# Patient Record
Sex: FEMALE | Race: WHITE | NOT HISPANIC OR LATINO | Employment: FULL TIME | URBAN - METROPOLITAN AREA
[De-identification: names, ages, dates, MRNs, and addresses within clinical notes are randomized per-mention and may not be internally consistent; named-entity substitution may affect disease eponyms.]

---

## 2023-05-22 ENCOUNTER — HOSPITAL ENCOUNTER (OUTPATIENT)
Dept: DATA CONVERSION | Facility: HOSPITAL | Age: 28
End: 2023-05-22
Attending: ORTHOPAEDIC SURGERY | Admitting: ORTHOPAEDIC SURGERY
Payer: COMMERCIAL

## 2023-05-22 DIAGNOSIS — Z79.82 LONG TERM (CURRENT) USE OF ASPIRIN: ICD-10-CM

## 2023-05-22 DIAGNOSIS — Z87.81 PERSONAL HISTORY OF (HEALED) TRAUMATIC FRACTURE: ICD-10-CM

## 2023-05-22 DIAGNOSIS — Z47.2 ENCOUNTER FOR REMOVAL OF INTERNAL FIXATION DEVICE: ICD-10-CM

## 2023-05-22 DIAGNOSIS — T84.84XA PAIN DUE TO INTERNAL ORTHOPEDIC PROSTHETIC DEVICES, IMPLANTS AND GRAFTS, INITIAL ENCOUNTER (CMS-HCC): ICD-10-CM

## 2023-09-07 VITALS
DIASTOLIC BLOOD PRESSURE: 64 MMHG | SYSTOLIC BLOOD PRESSURE: 111 MMHG | BODY MASS INDEX: 21.35 KG/M2 | RESPIRATION RATE: 18 BRPM | HEIGHT: 61 IN | WEIGHT: 113.1 LBS | TEMPERATURE: 97.5 F | HEART RATE: 78 BPM

## 2023-09-30 PROBLEM — S52.209A FRACTURE OF ULNA: Status: ACTIVE | Noted: 2023-09-30

## 2023-09-30 PROBLEM — S52.109A RADIUS AND ULNA PROXIMAL END FRACTURE: Status: ACTIVE | Noted: 2023-09-30

## 2023-09-30 PROBLEM — S52.009A RADIUS AND ULNA PROXIMAL END FRACTURE: Status: ACTIVE | Noted: 2023-09-30

## 2023-09-30 PROBLEM — K58.9 IRRITABLE BOWEL SYNDROME: Status: ACTIVE | Noted: 2023-09-30

## 2023-09-30 PROBLEM — F41.0 GENERALIZED ANXIETY DISORDER WITH PANIC ATTACKS: Status: ACTIVE | Noted: 2023-09-30

## 2023-09-30 PROBLEM — S62.174A: Status: ACTIVE | Noted: 2023-09-30

## 2023-09-30 PROBLEM — T78.1XXA GASTROINTESTINAL FOOD SENSITIVITY: Status: ACTIVE | Noted: 2023-09-30

## 2023-09-30 PROBLEM — F41.1 GENERALIZED ANXIETY DISORDER WITH PANIC ATTACKS: Status: ACTIVE | Noted: 2023-09-30

## 2023-09-30 PROBLEM — K59.09 CHRONIC CONSTIPATION: Status: ACTIVE | Noted: 2023-09-30

## 2023-09-30 PROBLEM — S62.234A OTHER NONDISPLACED FRACTURE OF BASE OF FIRST METACARPAL BONE, RIGHT HAND, INITIAL ENCOUNTER FOR CLOSED FRACTURE: Status: ACTIVE | Noted: 2023-09-30

## 2023-09-30 PROBLEM — R59.9 REACTIVE LYMPHADENOPATHY: Status: ACTIVE | Noted: 2023-09-30

## 2023-09-30 RX ORDER — ACETAMINOPHEN 500 MG
TABLET ORAL
COMMUNITY

## 2023-09-30 RX ORDER — SERTRALINE HYDROCHLORIDE 50 MG/1
1 TABLET, FILM COATED ORAL
COMMUNITY
Start: 2022-10-24 | End: 2023-10-25 | Stop reason: SDUPTHER

## 2023-09-30 RX ORDER — MULTIVITAMIN
1 TABLET ORAL 2 TIMES DAILY
COMMUNITY
Start: 2022-12-16 | End: 2023-10-25 | Stop reason: ALTCHOICE

## 2023-09-30 RX ORDER — DOCUSATE SODIUM 100 MG/1
CAPSULE, LIQUID FILLED ORAL
COMMUNITY
End: 2023-10-25 | Stop reason: ALTCHOICE

## 2023-09-30 RX ORDER — TRETINOIN 0.5 MG/G
1 CREAM TOPICAL
COMMUNITY
Start: 2015-02-12 | End: 2023-10-25 | Stop reason: ALTCHOICE

## 2023-09-30 NOTE — H&P
History of Present Illness:   Pregnant/Lactating:  ·  Are You Pregnant no   ·  Are You Currently Breastfeeding no     History Present Illness:  Reason for surgery: hardware removal   HPI:    Patient presents for hardware removal    Allergies:        Allergies:  ·  sulfa drugs : Rash  ·  Gluten : Other    Home Medication Review:   Home Medications Reviewed: yes     Impression/Procedure:   ·  Impression and Planned Procedure: Hardware removal from forearm       ERAS (Enhanced Recovery After Surgery):  ·  ERAS Patient: no       Physical Exam by System:    Respiratory/Thorax: Patent airways, good chest expansion,  thorax symmetric   Cardiovascular: RRR to palpation     Consent:   COVID-19 Consent:  ·  COVID-19 Risk Consent Surgeon has reviewed key risks related to the risk of kate COVID-19 and if they contract COVID-19 what the risks are.     Attestation:   Note Completion:  I am a:  Resident/Fellow   Attending Attestation I saw and evaluated the patient.  I personally obtained the key and critical portions of the history and physical exam or was physically present for key and  critical portions performed by the resident/fellow. I reviewed the resident/fellow?s documentation and discussed the patient with the resident/fellow.  I agree with the resident/fellow?s medical decision making as documented in the note.     I personally evaluated the patient on 22-May-2023         Electronic Signatures:  Reece Nichols)  (Signed 22-May-2023 08:31)   Authored: Note Completion   Co-Signer: History of Present Illness, Allergies, Home Medication Review, Impression/Procedure, ERAS, Physical Exam, Consent, Note Completion  Justin Oscar (Resident))  (Signed 22-May-2023 05:47)   Authored: History of Present Illness, Allergies, Home  Medication Review, Impression/Procedure, ERAS, Physical Exam, Consent, Note Completion      Last Updated: 22-May-2023 08:31 by Reece Nichols)

## 2023-09-30 NOTE — H&P
History of Present Illness:   Pregnant/Lactating:  ·  Are You Pregnant no (1)   ·  Are You Currently Breastfeeding no (1)     History Present Illness:  Reason for surgery: removal of hardware from L arm   HPI:    DENISE is 10 months post-op from ORIF left both bone forearm fracture on 6/10/2022. She is doing well at this point. Pain is well controlled. Denies fevers or chills.  Denies drainage from the wound. She reports no additional symptoms or concerns. No shortness of breath or chest pain. No calf swelling or pain.     Allergies:        Allergies:  ·  sulfa drugs : Rash  ·  Latex : Itching, Hives/Urticaria  ·  Gluten : Other    Home Medication Review:   Home Medications Reviewed: yes     Impression/Procedure:   ·  Impression and Planned Procedure: YUE from L arm       ERAS (Enhanced Recovery After Surgery):  ·  ERAS Patient: no       Vital Signs:  Temperature C: 36.4 degrees C   Temperature F: 97.5 degrees F   Heart Rate: 78 beats per minute   Respiratory Rate: 18 breath per minute   Blood Pressure Systolic: 111 mm/Hg   Blood Pressure Diastolic: 64 mm/Hg     Physical Exam by System:    Respiratory/Thorax: non labored breathing, on room  air   Cardiovascular: RRR, extremities warm and well perfused   Musculoskeletal: LUE:   - Surgical site well healed   -Motor intact in axillary/AIN/PIN/ulnar distributions  -SILT axillary/radial/median/ulnar distributions  -Hand warm, well perfused  -Palpable radial pulse, cap refill brisk  -Compartments soft and compressible.   Psychological: appropriate behavior     Consent:   COVID-19 Consent:  ·  COVID-19 Risk Consent Surgeon has reviewed key risks related to the risk of kate COVID-19 and if they contract COVID-19 what the risks are.     Attestation:   Note Completion:  I am a:  Resident/Fellow   Attending Attestation I saw and evaluated the patient.  I personally obtained the key and critical portions of the history and physical exam or was physically present for  "key and  critical portions performed by the resident/fellow. I reviewed the resident/fellow?s documentation and discussed the patient with the resident/fellow.  I agree with the resident/fellow?s medical decision making as documented in the note.     I personally evaluated the patient on 22-May-2023         Electronic Signatures:  Reece Nichols (MD)  (Signed 22-May-2023 08:31)   Authored: Physical Exam, Note Completion   Co-Signer: History of Present Illness, Allergies, Home Medication Review, Impression/Procedure, ERAS, Physical Exam, Consent, Note Completion  Justine Mejia (Resident))  (Signed 22-May-2023 06:09)   Authored: History of Present Illness, Allergies, Home  Medication Review, Impression/Procedure, ERAS, Physical Exam, Consent, Note Completion      Last Updated: 22-May-2023 08:31 by Reece Nichols (MD)    References:  1.  Data Referenced From \"Patient Profile - Preop v3\" 22-May-2023 06:00   "

## 2023-10-02 NOTE — OP NOTE
PROCEDURE DETAILS    Preoperative Diagnosis:  L forearm prominent hardware  Postoperative Diagnosis:  L forearm prominent hardware  Surgeon: Simone  Resident/Fellow/Other Assistant: Celestina    Procedure:  L ulna removal of hardware  Estimated Blood Loss: 10  Findings: Consistent with preoperative diagnosis                                Attestation:   Note Completion:  Attending Attestation I was present for key portions of the procedure and the procedure lasted longer than 5 minutes.    I am a: Resident/Fellow         Electronic Signatures:  Reece Nichols)  (Signed 22-May-2023 12:16)   Authored: Note Completion   Co-Signer: Post-Operative Note, Chart Review, Note Completion  Justin Oscar (Resident))  (Signed 22-May-2023 08:22)   Authored: Post-Operative Note, Chart Review, Note Completion      Last Updated: 22-May-2023 12:16 by Reeec Nichols)

## 2023-10-04 ENCOUNTER — TREATMENT (OUTPATIENT)
Dept: OCCUPATIONAL THERAPY | Facility: HOSPITAL | Age: 28
End: 2023-10-04
Payer: COMMERCIAL

## 2023-10-04 DIAGNOSIS — M25.522 PAIN IN LEFT ELBOW: Primary | ICD-10-CM

## 2023-10-04 PROCEDURE — 97035 APP MDLTY 1+ULTRASOUND EA 15: CPT | Mod: GO | Performed by: OCCUPATIONAL THERAPIST

## 2023-10-04 PROCEDURE — 97110 THERAPEUTIC EXERCISES: CPT | Mod: GO | Performed by: OCCUPATIONAL THERAPIST

## 2023-10-04 PROCEDURE — 97016 VASOPNEUMATIC DEVICE THERAPY: CPT | Mod: GO | Performed by: OCCUPATIONAL THERAPIST

## 2023-10-04 PROCEDURE — 97140 MANUAL THERAPY 1/> REGIONS: CPT | Mod: GO | Performed by: OCCUPATIONAL THERAPIST

## 2023-10-04 ASSESSMENT — ENCOUNTER SYMPTOMS
LOSS OF SENSATION IN FEET: 0
DEPRESSION: 0
OCCASIONAL FEELINGS OF UNSTEADINESS: 0

## 2023-10-04 ASSESSMENT — PAIN - FUNCTIONAL ASSESSMENT: PAIN_FUNCTIONAL_ASSESSMENT: 0-10

## 2023-10-04 ASSESSMENT — PAIN SCALES - GENERAL: PAINLEVEL_OUTOF10: 0 - NO PAIN

## 2023-10-04 NOTE — PROGRESS NOTES
Occupational Therapy  Occupational Therapy Treatment    Patient Name: Cely Chong  MRN: [id number]  Today's Date: 10/4/2023  Time Calculation  Start Time: 1000  Stop Time: 1105  Time Calculation (min): 65 min    Visit number: 4 of 40    Insurance Type: MMO    Assessment: Pt did well with exercises today, she is doing more overall and trying some of the exercises at home.  She did have an issue with the back which limited her in doing so, however she feels she can get back on track with exercises now.       Plan: Continue with progressive exercises for UE strengthening       Current Problem  1. Pain in left elbow  Follow Up In Occupational Therapy          General     Precautions  Precautions  STEADI Fall Risk Score (The score of 4 or more indicates an increased risk of falling): 0      Subjective:   Subjective   Patient reports He has some irration on the top of his hand    Pain  Pain Assessment: 0-10  Pain Score: 0 - No pain    Objective:   Objective     Elbow/Forearm AROM WNL    ELBOW    R L   Extension 5/5 4+/5   Flexion 5/5 4/5   Pronation 5/5 4+/5   Supination 5/5 4+/5      Physical Observation: no new observations    Treatments:     Therex:   Exercise Sets/Reps Comments   Miko row, shoulder ext, tricep rope ext 3x10 each    Red flexbar wrist flex/ext 3x10 each    TRX Y, press 310 each                   Medbridge HEP Code        Manual: STM flexor pronator group, bicep/tricep     Modalities: X10 min themx hot compression to L elbow 110 degrees F @ 45 mmHg pressure   X8 min continuous 3MHz @ 1.0w/cm2 to L lateral elbow      Orthosis:      Therapeutic Activity:      Wound Care:     Neuro Re-ed:      Self-Care:     Other Treatment:     Matt Ramirez, OT

## 2023-10-13 ENCOUNTER — TREATMENT (OUTPATIENT)
Dept: OCCUPATIONAL THERAPY | Facility: HOSPITAL | Age: 28
End: 2023-10-13
Payer: COMMERCIAL

## 2023-10-13 DIAGNOSIS — M25.522 PAIN IN LEFT ELBOW: Primary | ICD-10-CM

## 2023-10-13 PROCEDURE — 97016 VASOPNEUMATIC DEVICE THERAPY: CPT | Mod: GO | Performed by: OCCUPATIONAL THERAPIST

## 2023-10-13 PROCEDURE — 97110 THERAPEUTIC EXERCISES: CPT | Mod: GO | Performed by: OCCUPATIONAL THERAPIST

## 2023-10-13 PROCEDURE — 97035 APP MDLTY 1+ULTRASOUND EA 15: CPT | Mod: GO | Performed by: OCCUPATIONAL THERAPIST

## 2023-10-13 ASSESSMENT — PAIN - FUNCTIONAL ASSESSMENT: PAIN_FUNCTIONAL_ASSESSMENT: 0-10

## 2023-10-13 ASSESSMENT — PAIN SCALES - GENERAL: PAINLEVEL_OUTOF10: 0 - NO PAIN

## 2023-10-13 NOTE — PROGRESS NOTES
Occupational Therapy  Occupational Therapy Treatment    Patient Name: Cely Chong  MRN: [id number]  Today's Date: 10/13/2023  Time Calculation  Start Time: 0900  Stop Time: 0958  Time Calculation (min): 58 min    Visit number: 4 of 40    Insurance Type: MMO    Assessment: Pt seems to be gaining some strength overall.  She has difficulty still with forearm and elbow strength specifically in flexion and radial deviation.  Tricep strength improved.       Plan: Continue with progressive exercises for UE strengthening       Current Problem  1. Pain in left elbow            Subjective:   Subjective   Patient reports He has some irration on the top of his hand    Pain  Pain Assessment: 0-10  Pain Score: 0 - No pain    Objective:   Objective     Elbow/Forearm AROM WNL    ELBOW    R L   Extension 5/5 5/5   Flexion 5/5 4/5   Pronation 5/5 4+/5   Supination 5/5 4+/5      Physical Observation: no new observations    Treatments:     Therex:   Exercise Sets/Reps Comments   3# dbell hammer curl  3x10    Supine tricep ext 3# 3x10    Incline pushup  3x8    Arm bike x2 min fwd, x2 min rev               Medbridge HEP Code        Manual: STM flexor pronator group, bicep/tricep     Modalities: X10 min themx hot compression to L elbow 110 degrees F @ 45 mmHg pressure   X8 min continuous 3MHz @ 1.0w/cm2 to L lateral elbow          Matt Ramirez OT

## 2023-10-19 ENCOUNTER — TREATMENT (OUTPATIENT)
Dept: OCCUPATIONAL THERAPY | Facility: HOSPITAL | Age: 28
End: 2023-10-19
Payer: COMMERCIAL

## 2023-10-19 DIAGNOSIS — M25.522 PAIN IN LEFT ELBOW: Primary | ICD-10-CM

## 2023-10-19 PROCEDURE — 97110 THERAPEUTIC EXERCISES: CPT | Mod: GO | Performed by: OCCUPATIONAL THERAPIST

## 2023-10-19 PROCEDURE — 97016 VASOPNEUMATIC DEVICE THERAPY: CPT | Mod: GO | Performed by: OCCUPATIONAL THERAPIST

## 2023-10-19 PROCEDURE — 97035 APP MDLTY 1+ULTRASOUND EA 15: CPT | Mod: GO | Performed by: OCCUPATIONAL THERAPIST

## 2023-10-19 ASSESSMENT — PAIN SCALES - GENERAL: PAINLEVEL_OUTOF10: 0 - NO PAIN

## 2023-10-19 ASSESSMENT — PAIN - FUNCTIONAL ASSESSMENT: PAIN_FUNCTIONAL_ASSESSMENT: 0-10

## 2023-10-19 NOTE — PROGRESS NOTES
Occupational Therapy  Occupational Therapy Treatment    Patient Name: Cely Chong  MRN: [id number]  Today's Date: 10/19/2023  Time Calculation  Start Time: 0850  Stop Time: 0955  Time Calculation (min): 65 min    Visit number: 5 of 40  Insurance Type: MMO    Assessment: Pt continuing to perform exercises to challenge overall UE strength.  Rolly start to incorporate further types of compound lifts going forward.       Plan: Continue with progressive exercises for UE strengthening       Current Problem  1. Pain in left elbow              Subjective:   Subjective   Patient reports He has some irration on the top of his hand    Pain  Pain Assessment: 0-10  Pain Score: 0 - No pain    Objective:   Objective     Elbow/Forearm AROM WNL    ELBOW    R L   Extension 5/5 5/5   Flexion 5/5 4/5   Pronation 5/5 4+/5   Supination 5/5 4+/5      Physical Observation: no new observations    Treatments:     Therex:   Exercise Sets/Reps Comments    exerciser 3x10    1# prone IYT 2x10 each    [place] rev tricep ext 3x10    Miko st bar rev curl  3x10                          Modalities: X10 min themx hot compression to L elbow 110 degrees F @ 45 mmHg pressure   X8 min continuous 3MHz @ 1.0w/cm2 to L lateral elbow          Matt Ramirez OT

## 2023-10-25 ENCOUNTER — OFFICE VISIT (OUTPATIENT)
Dept: PRIMARY CARE | Facility: CLINIC | Age: 28
End: 2023-10-25
Payer: COMMERCIAL

## 2023-10-25 VITALS
HEART RATE: 76 BPM | DIASTOLIC BLOOD PRESSURE: 81 MMHG | OXYGEN SATURATION: 98 % | HEIGHT: 61 IN | BODY MASS INDEX: 21.9 KG/M2 | WEIGHT: 116 LBS | SYSTOLIC BLOOD PRESSURE: 114 MMHG

## 2023-10-25 DIAGNOSIS — F41.1 GENERALIZED ANXIETY DISORDER WITH PANIC ATTACKS: ICD-10-CM

## 2023-10-25 DIAGNOSIS — F41.0 GENERALIZED ANXIETY DISORDER WITH PANIC ATTACKS: ICD-10-CM

## 2023-10-25 DIAGNOSIS — Z00.00 ANNUAL PHYSICAL EXAM: Primary | ICD-10-CM

## 2023-10-25 DIAGNOSIS — Z23 ENCOUNTER FOR IMMUNIZATION: ICD-10-CM

## 2023-10-25 DIAGNOSIS — F32.89 OTHER DEPRESSION: ICD-10-CM

## 2023-10-25 DIAGNOSIS — E55.9 VITAMIN D DEFICIENCY: ICD-10-CM

## 2023-10-25 DIAGNOSIS — D50.8 OTHER IRON DEFICIENCY ANEMIA: ICD-10-CM

## 2023-10-25 PROBLEM — F32.A DEPRESSION: Status: ACTIVE | Noted: 2023-10-25

## 2023-10-25 PROBLEM — S52.209A FRACTURE OF ULNA: Status: RESOLVED | Noted: 2023-09-30 | Resolved: 2023-10-25

## 2023-10-25 PROBLEM — S62.234A OTHER NONDISPLACED FRACTURE OF BASE OF FIRST METACARPAL BONE, RIGHT HAND, INITIAL ENCOUNTER FOR CLOSED FRACTURE: Status: RESOLVED | Noted: 2023-09-30 | Resolved: 2023-10-25

## 2023-10-25 PROBLEM — S62.174A: Status: RESOLVED | Noted: 2023-09-30 | Resolved: 2023-10-25

## 2023-10-25 PROBLEM — M25.522 PAIN IN LEFT ELBOW: Status: RESOLVED | Noted: 2023-10-04 | Resolved: 2023-10-25

## 2023-10-25 PROBLEM — S52.109A RADIUS AND ULNA PROXIMAL END FRACTURE: Status: RESOLVED | Noted: 2023-09-30 | Resolved: 2023-10-25

## 2023-10-25 PROBLEM — S52.009A RADIUS AND ULNA PROXIMAL END FRACTURE: Status: RESOLVED | Noted: 2023-09-30 | Resolved: 2023-10-25

## 2023-10-25 PROCEDURE — 90686 IIV4 VACC NO PRSV 0.5 ML IM: CPT | Performed by: FAMILY MEDICINE

## 2023-10-25 PROCEDURE — 1036F TOBACCO NON-USER: CPT | Performed by: FAMILY MEDICINE

## 2023-10-25 PROCEDURE — 90471 IMMUNIZATION ADMIN: CPT | Performed by: FAMILY MEDICINE

## 2023-10-25 PROCEDURE — 99385 PREV VISIT NEW AGE 18-39: CPT | Performed by: FAMILY MEDICINE

## 2023-10-25 RX ORDER — SERTRALINE HYDROCHLORIDE 25 MG/1
25 TABLET, FILM COATED ORAL DAILY
Qty: 90 TABLET | Refills: 0 | Status: SHIPPED | OUTPATIENT
Start: 2023-10-25 | End: 2023-11-27 | Stop reason: SDUPTHER

## 2023-10-25 ASSESSMENT — PATIENT HEALTH QUESTIONNAIRE - PHQ9
8. MOVING OR SPEAKING SO SLOWLY THAT OTHER PEOPLE COULD HAVE NOTICED. OR THE OPPOSITE, BEING SO FIGETY OR RESTLESS THAT YOU HAVE BEEN MOVING AROUND A LOT MORE THAN USUAL: MORE THAN HALF THE DAYS
SUM OF ALL RESPONSES TO PHQ QUESTIONS 1-9: 19
6. FEELING BAD ABOUT YOURSELF - OR THAT YOU ARE A FAILURE OR HAVE LET YOURSELF OR YOUR FAMILY DOWN: MORE THAN HALF THE DAYS
10. IF YOU CHECKED OFF ANY PROBLEMS, HOW DIFFICULT HAVE THESE PROBLEMS MADE IT FOR YOU TO DO YOUR WORK, TAKE CARE OF THINGS AT HOME, OR GET ALONG WITH OTHER PEOPLE: SOMEWHAT DIFFICULT
4. FEELING TIRED OR HAVING LITTLE ENERGY: NEARLY EVERY DAY
9. THOUGHTS THAT YOU WOULD BE BETTER OFF DEAD, OR OF HURTING YOURSELF: SEVERAL DAYS
5. POOR APPETITE OR OVEREATING: MORE THAN HALF THE DAYS
3. TROUBLE FALLING OR STAYING ASLEEP OR SLEEPING TOO MUCH: NEARLY EVERY DAY
2. FEELING DOWN, DEPRESSED OR HOPELESS: MORE THAN HALF THE DAYS
1. LITTLE INTEREST OR PLEASURE IN DOING THINGS: MORE THAN HALF THE DAYS
7. TROUBLE CONCENTRATING ON THINGS, SUCH AS READING THE NEWSPAPER OR WATCHING TELEVISION: MORE THAN HALF THE DAYS
SUM OF ALL RESPONSES TO PHQ9 QUESTIONS 1 AND 2: 4

## 2023-10-25 ASSESSMENT — ANXIETY QUESTIONNAIRES
7. FEELING AFRAID AS IF SOMETHING AWFUL MIGHT HAPPEN: MORE THAN HALF THE DAYS
3. WORRYING TOO MUCH ABOUT DIFFERENT THINGS: MORE THAN HALF THE DAYS
IF YOU CHECKED OFF ANY PROBLEMS ON THIS QUESTIONNAIRE, HOW DIFFICULT HAVE THESE PROBLEMS MADE IT FOR YOU TO DO YOUR WORK, TAKE CARE OF THINGS AT HOME, OR GET ALONG WITH OTHER PEOPLE: SOMEWHAT DIFFICULT
6. BECOMING EASILY ANNOYED OR IRRITABLE: MORE THAN HALF THE DAYS
2. NOT BEING ABLE TO STOP OR CONTROL WORRYING: MORE THAN HALF THE DAYS
5. BEING SO RESTLESS THAT IT IS HARD TO SIT STILL: SEVERAL DAYS
1. FEELING NERVOUS, ANXIOUS, OR ON EDGE: MORE THAN HALF THE DAYS
GAD7 TOTAL SCORE: 12
4. TROUBLE RELAXING: SEVERAL DAYS

## 2023-10-25 ASSESSMENT — ENCOUNTER SYMPTOMS
DEPRESSION: 0
LOSS OF SENSATION IN FEET: 0
OCCASIONAL FEELINGS OF UNSTEADINESS: 0

## 2023-10-25 ASSESSMENT — COLUMBIA-SUICIDE SEVERITY RATING SCALE - C-SSRS
2. HAVE YOU ACTUALLY HAD ANY THOUGHTS OF KILLING YOURSELF?: NO
1. IN THE PAST MONTH, HAVE YOU WISHED YOU WERE DEAD OR WISHED YOU COULD GO TO SLEEP AND NOT WAKE UP?: NO

## 2023-10-25 NOTE — PROGRESS NOTES
"Subjective   Patient ID: Cely Chong is a 28 y.o. female who presents for Anxiety and Annual Exam.    Last Annual Physical: Oct 2020  Last Dental Visit: Sept 2023  Last Eye exam: April 2023  Hearing Concerns: No   Diet: Well- balanced   Exercise Routine: Some exercise       HPI   The patient reports that she is taking Zoloft for her anxiety and depression as prescribed and is currently following up with Psych. She mentions that her depression is not well-controlled at this time.     Review of Systems  Constitutional: No fever or chills, No Night Sweats  Eyes: No Blurry Vision or Eye sight problems  ENT: No Nasal Discharge, Hoarseness, sore throat  Cardiovascular: no chest pain, no palpitations and no syncope.   Respiratory: no cough, no shortness of breath during exertion and no shortness of breath at rest.   Gastrointestinal: no abdominal pain, no nausea and no vomiting.   : No vaginal discharge, burning with urination, no blood in urine or stools  Skin: No Skin rashes or Lesions  Neuro: + Headache, no dizziness or Numbness or tingling  Psych: + Anxiety, depression or sleeping problems  Heme: No Easy bleeding or brusing.     Objective   /81   Pulse 76   Ht 1.549 m (5' 1\")   Wt 52.6 kg (116 lb)   SpO2 98%   BMI 21.92 kg/m²     Physical Exam  Patient declined Chaperone  Constitutional: Alert and in no acute distress. Well developed, well nourished.   Head and Face: Head and face: Normal.    Eyes: Normal external exam. Pupils were equal in size, round, reactive to light (PERRL) with normal accommodation and extraocular movements intact (EOMI).   Ears, Nose, Mouth, and Throat: External inspection of ears and nose: Normal.  Hearing: Normal.  Nasal mucosa, septum, and turbinates: Normal.  Lips, teeth, and gums: Normal.  Oropharynx: Normal.   Neck: No neck mass was observed. Supple. Thyroid not enlarged and there were no palpable thyroid nodules.   Cardiovascular: Heart rate and rhythm were normal, " normal S1 and S2. Pedal pulses: Normal. No peripheral edema.   Pulmonary: No respiratory distress. Clear bilateral breath sounds.   Abdomen: Soft nontender; no abdominal mass palpated. Normal bowel sounds. No organomegaly.   Musculoskeletal: No joint swelling seen, normal movements of all extremities. Range of motion: Normal.  Muscle strength/tone: Normal.    Skin: Normal skin color and pigmentation, normal skin turgor, and no rash.   Neurologic: Deep tendon reflexes were 2+ and symmetric.   Psychiatric: Judgment and insight: Intact. Mood and affect: Normal.  Lymphatic: No cervical lymphadenopathy.     Lab Results   Component Value Date    WBC 5.5 06/11/2022    HGB 10.3 (L) 06/11/2022    HCT 31.3 (L) 06/11/2022     06/11/2022     06/08/2022    K 3.6 06/08/2022     06/08/2022    CREATININE 0.73 06/08/2022    BUN 9 06/08/2022    CO2 21 06/08/2022    INR 1.2 (H) 06/08/2022       XR forearm  Narrative: Interpreted By:  ARTEMIO SOLIS MD  MRN: [id number]  Patient Name: DENISE RICHARDS     STUDY:  FOREARM, MIN 2 VIEWS;  9/14/2023 2:54 pm     INDICATION:  s/p fracture  S62.174A: Closed nondisplaced fracture of trapezium of  right wrist, initial encounter.     COMPARISON:  06/08/2022     ACCESSION NUMBER(S):  [id number]     ORDERING CLINICIAN:  OSVALDO CHOUDHARY     FINDINGS:  Left forearm, two views     Plate and screw fixation of a radial diaphyseal fracture with intact  hardware. Postsurgical changes in the ulnar status post removal of  hardware. Remote fracture deformity which is healed present. Surgical  clips over the soft tissues. No acute fracture dislocation seen     Impression: No acute fracture. Remote ulnar and radial diaphyseal fracture  deformities status post fixation with removal of the hardware in the  ulna. The hardware in the radius is intact.      Assessment/Plan   Diagnoses and all orders for this visit:  Annual physical exam  -     CBC; Future  -     Comprehensive Metabolic Panel;  Future  -     Hemoglobin A1C; Future  -     Lipid Panel; Future  -     TSH with reflex to Free T4 if abnormal; Future  Generalized anxiety disorder with panic attacks  -     sertraline (Zoloft) 25 mg tablet; Take 1 tablet (25 mg) by mouth once daily.  -     Follow Up In [facility] - PCP - Established; Future  Other depression  -     sertraline (Zoloft) 25 mg tablet; Take 1 tablet (25 mg) by mouth once daily.  -     Follow Up In [facility] - PCP - Established; Future  Vitamin D deficiency  -     Vitamin B12; Future  -     Vitamin D 25-Hydroxy,Total (for eval of Vitamin D levels); Future  Other iron deficiency anemia  -     Ferritin; Future  -     Iron and TIBC; Future  Encounter for immunization  -     Flu vaccine (IIV4) age 6 months and greater, preservative free        Dear Cely Chong     It was my pleasure to take care of you today in the office. Below are the things we discussed today:    1. 1. Immunizations: Yearly Flu shot is recommended. Given today          a: COVID: Please get booster from the pharmacy         b: Tetanus: Up-to-date         C. HPV: Up-to-date     2. Blood Work: Ordered  3. Seen your dentist twice a year  4. Yearly Eye exam is recommended    5. BMI: Normal  6: Diet recommendations:   Eat Clean, Try to have as many home cooked meals as possible  Avoid processed foods which contain excess calories, sugar, and sodium.    7. Exercise recommendations:   150 minutes a week to maintain your weight     If you have to loose weight, you need a better diet and exercise plan.     8. PAP - The patient will come back for it.    9. Please get your Living will / Advance directive completed if you do not have one already. Please make sure our office has a copy of the latest one.     10. Depression and anxiety: Start 25 mg Zoloft once daily. Advised the patient that if she feels like this is not controlling her depression well increase dose to 50 mg after 2 weeks. Continue following  up with Psych.    Follow up in 1 month.    Follow up in one year for a Physical. Please call the office before your Physical to see if you need blood work completed prior to your physical.     Please call me if any questions arise from now until your next visit. I will call you after I am done seeing patients. A Doctor is always available by phone when the office is closed. Please feel free to call for help with any problem that you feel shouldn't wait until the office re-opens.     Scribe Attestation  By signing my name below, IKrissy Scribe   attest that this documentation has been prepared under the direction and in the presence of Edna Galloway MD.

## 2023-10-30 ENCOUNTER — TREATMENT (OUTPATIENT)
Dept: OCCUPATIONAL THERAPY | Facility: HOSPITAL | Age: 28
End: 2023-10-30
Payer: COMMERCIAL

## 2023-10-30 DIAGNOSIS — M25.522 PAIN IN LEFT ELBOW: Primary | ICD-10-CM

## 2023-10-30 PROCEDURE — 97016 VASOPNEUMATIC DEVICE THERAPY: CPT | Mod: GO | Performed by: OCCUPATIONAL THERAPIST

## 2023-10-30 PROCEDURE — 97140 MANUAL THERAPY 1/> REGIONS: CPT | Mod: GO | Performed by: OCCUPATIONAL THERAPIST

## 2023-10-30 PROCEDURE — 97110 THERAPEUTIC EXERCISES: CPT | Mod: GO | Performed by: OCCUPATIONAL THERAPIST

## 2023-10-30 ASSESSMENT — PAIN SCALES - GENERAL: PAINLEVEL_OUTOF10: 0 - NO PAIN

## 2023-10-30 ASSESSMENT — PAIN - FUNCTIONAL ASSESSMENT: PAIN_FUNCTIONAL_ASSESSMENT: 0-10

## 2023-10-30 NOTE — PROGRESS NOTES
Occupational Therapy  Occupational Therapy Treatment    Patient Name: Cely Chong  MRN: [id number]  Today's Date: 10/30/2023  Time Calculation  Start Time: 1000  Stop Time: 1100  Time Calculation (min): 60 min    Visit number: 5 of 40  Insurance Type: MMO    Assessment: Pt continuing to perform exercises to challenge overall UE strength.  Rolly start to incorporate further types of compound lifts going forward.       Plan: Continue with progressive exercises for UE strengthening       Current Problem  1. Pain in left elbow                Subjective:   Subjective   Patient reports no new issues    Pain  Pain Assessment: 0-10  Pain Score: 0 - No pain    Objective:   Objective     Elbow/Forearm AROM WNL    ELBOW    R L   Extension 5/5 5/5   Flexion 5/5 4/5   Pronation 5/5 4+/5   Supination 5/5 4+/5      Physical Observation: no new observations    Treatments:     Therex:   Exercise Sets/Reps Comments   Red flexbar wrist flex/ext/pro/sup 2x10 each    Miko rope hammer curl 3x10    Miko rope tricep ext  3x10    2# lat raise  3x10                          Modalities: X10 min themx hot compression to L elbow 110 degrees F @ 45 mmHg pressure   X8 min continuous 3MHz @ 1.0w/cm2 to L lateral elbow          Matt Ramirez OT

## 2023-11-06 ENCOUNTER — TREATMENT (OUTPATIENT)
Dept: OCCUPATIONAL THERAPY | Facility: HOSPITAL | Age: 28
End: 2023-11-06
Payer: COMMERCIAL

## 2023-11-06 DIAGNOSIS — M25.522 PAIN IN LEFT ELBOW: Primary | ICD-10-CM

## 2023-11-06 PROCEDURE — 97110 THERAPEUTIC EXERCISES: CPT | Mod: GO | Performed by: OCCUPATIONAL THERAPIST

## 2023-11-06 PROCEDURE — 97016 VASOPNEUMATIC DEVICE THERAPY: CPT | Mod: GO | Performed by: OCCUPATIONAL THERAPIST

## 2023-11-06 ASSESSMENT — PAIN - FUNCTIONAL ASSESSMENT: PAIN_FUNCTIONAL_ASSESSMENT: 0-10

## 2023-11-06 ASSESSMENT — PAIN SCALES - GENERAL: PAINLEVEL_OUTOF10: 0 - NO PAIN

## 2023-11-06 NOTE — PROGRESS NOTES
Occupational Therapy  Occupational Therapy Treatment    Patient Name: Cely Chong  MRN: [id number]  Today's Date: 11/6/2023  Time Calculation  Start Time: 1000  Stop Time: 1100  Time Calculation (min): 60 min    Visit number: 8 of 40  Insurance Type: MMO    Assessment: Pt able to perform exercises well.  She does still have some forearm soreness with increased activity.  Overall she is doing well, she has exercises she can continue with.  We will tentatively plan today to be her last session, should she feel she needs to return in the upcoming weeks she can certainly reach out.       Plan:    Discharge OT in 4 weeks    Current Problem  1. Pain in left elbow            Subjective:   Subjective   Patient reports no new issues    Pain  Pain Assessment: 0-10  Pain Score: 0 - No pain    Objective:   Objective     Elbow/Forearm AROM WNL    ELBOW    R L   Extension 5/5 5/5   Flexion 5/5 4+/5   Pronation 5/5 5/5   Supination 5/5 4+/5      Physical Observation: no new observations    Treatments:     Therex:   Exercise Sets/Reps Comments   Arm bike x2 min fwd, x2 min rev      exerciser 3x10 reps    Wrist rope forearm burner 1.25# plate X3 reps wrist flex, x3 reps wrist ext    [place] rope hammer curl 3x10    Miko horiz ext tricep ext 3x10                     Modalities: X10 min themx hot compression to L elbow 110 degrees F @ 45 mmHg pressure   X8 min continuous 3MHz @ 1.0w/cm2 to L lateral elbow          Matt Ramirez, OT

## 2023-11-27 ENCOUNTER — PATIENT MESSAGE (OUTPATIENT)
Dept: PRIMARY CARE | Facility: CLINIC | Age: 28
End: 2023-11-27

## 2023-11-27 ENCOUNTER — OFFICE VISIT (OUTPATIENT)
Dept: PRIMARY CARE | Facility: CLINIC | Age: 28
End: 2023-11-27
Payer: COMMERCIAL

## 2023-11-27 VITALS
WEIGHT: 115 LBS | DIASTOLIC BLOOD PRESSURE: 70 MMHG | BODY MASS INDEX: 21.71 KG/M2 | HEIGHT: 61 IN | HEART RATE: 85 BPM | OXYGEN SATURATION: 99 % | SYSTOLIC BLOOD PRESSURE: 108 MMHG

## 2023-11-27 DIAGNOSIS — F32.89 OTHER DEPRESSION: ICD-10-CM

## 2023-11-27 DIAGNOSIS — E55.9 VITAMIN D DEFICIENCY: ICD-10-CM

## 2023-11-27 DIAGNOSIS — D50.8 OTHER IRON DEFICIENCY ANEMIA: ICD-10-CM

## 2023-11-27 DIAGNOSIS — Z00.00 ANNUAL PHYSICAL EXAM: ICD-10-CM

## 2023-11-27 DIAGNOSIS — F41.0 GENERALIZED ANXIETY DISORDER WITH PANIC ATTACKS: ICD-10-CM

## 2023-11-27 DIAGNOSIS — F41.1 GENERALIZED ANXIETY DISORDER WITH PANIC ATTACKS: ICD-10-CM

## 2023-11-27 LAB
25(OH)D3 SERPL-MCNC: 9 NG/ML (ref 30–100)
ALBUMIN SERPL BCP-MCNC: 4.4 G/DL (ref 3.4–5)
ALP SERPL-CCNC: 61 U/L (ref 33–110)
ALT SERPL W P-5'-P-CCNC: 16 U/L (ref 7–45)
ANION GAP SERPL CALC-SCNC: 13 MMOL/L (ref 10–20)
AST SERPL W P-5'-P-CCNC: 17 U/L (ref 9–39)
BILIRUB SERPL-MCNC: 1.9 MG/DL (ref 0–1.2)
BUN SERPL-MCNC: 9 MG/DL (ref 6–23)
CALCIUM SERPL-MCNC: 9.7 MG/DL (ref 8.6–10.6)
CHLORIDE SERPL-SCNC: 104 MMOL/L (ref 98–107)
CHOLEST SERPL-MCNC: 190 MG/DL (ref 0–199)
CHOLESTEROL/HDL RATIO: 2.7
CO2 SERPL-SCNC: 28 MMOL/L (ref 21–32)
CREAT SERPL-MCNC: 0.65 MG/DL (ref 0.5–1.05)
ERYTHROCYTE [DISTWIDTH] IN BLOOD BY AUTOMATED COUNT: 12.2 % (ref 11.5–14.5)
EST. AVERAGE GLUCOSE BLD GHB EST-MCNC: 80 MG/DL
FERRITIN SERPL-MCNC: 60 NG/ML (ref 8–150)
GFR SERPL CREATININE-BSD FRML MDRD: >90 ML/MIN/1.73M*2
GLUCOSE SERPL-MCNC: 85 MG/DL (ref 74–99)
HBA1C MFR BLD: 4.4 %
HCT VFR BLD AUTO: 44.9 % (ref 36–46)
HDLC SERPL-MCNC: 71.2 MG/DL
HGB BLD-MCNC: 14.6 G/DL (ref 12–16)
IRON SATN MFR SERPL: 24 % (ref 25–45)
IRON SERPL-MCNC: 91 UG/DL (ref 35–150)
LDLC SERPL CALC-MCNC: 107 MG/DL
MCH RBC QN AUTO: 30.8 PG (ref 26–34)
MCHC RBC AUTO-ENTMCNC: 32.5 G/DL (ref 32–36)
MCV RBC AUTO: 95 FL (ref 80–100)
NON HDL CHOLESTEROL: 119 MG/DL (ref 0–149)
NRBC BLD-RTO: 0 /100 WBCS (ref 0–0)
PLATELET # BLD AUTO: 224 X10*3/UL (ref 150–450)
POTASSIUM SERPL-SCNC: 4 MMOL/L (ref 3.5–5.3)
PROT SERPL-MCNC: 7 G/DL (ref 6.4–8.2)
RBC # BLD AUTO: 4.74 X10*6/UL (ref 4–5.2)
SODIUM SERPL-SCNC: 141 MMOL/L (ref 136–145)
TIBC SERPL-MCNC: 373 UG/DL (ref 240–445)
TRIGL SERPL-MCNC: 57 MG/DL (ref 0–149)
TSH SERPL-ACNC: 1.12 MIU/L (ref 0.44–3.98)
UIBC SERPL-MCNC: 282 UG/DL (ref 110–370)
VIT B12 SERPL-MCNC: 317 PG/ML (ref 211–911)
VLDL: 11 MG/DL (ref 0–40)
WBC # BLD AUTO: 5.1 X10*3/UL (ref 4.4–11.3)

## 2023-11-27 PROCEDURE — 80061 LIPID PANEL: CPT

## 2023-11-27 PROCEDURE — 83036 HEMOGLOBIN GLYCOSYLATED A1C: CPT

## 2023-11-27 PROCEDURE — 36415 COLL VENOUS BLD VENIPUNCTURE: CPT

## 2023-11-27 PROCEDURE — 85027 COMPLETE CBC AUTOMATED: CPT

## 2023-11-27 PROCEDURE — 82607 VITAMIN B-12: CPT

## 2023-11-27 PROCEDURE — 82728 ASSAY OF FERRITIN: CPT

## 2023-11-27 PROCEDURE — 84443 ASSAY THYROID STIM HORMONE: CPT

## 2023-11-27 PROCEDURE — 82306 VITAMIN D 25 HYDROXY: CPT

## 2023-11-27 PROCEDURE — 1036F TOBACCO NON-USER: CPT | Performed by: FAMILY MEDICINE

## 2023-11-27 PROCEDURE — 99213 OFFICE O/P EST LOW 20 MIN: CPT | Performed by: FAMILY MEDICINE

## 2023-11-27 PROCEDURE — 83540 ASSAY OF IRON: CPT

## 2023-11-27 PROCEDURE — 80053 COMPREHEN METABOLIC PANEL: CPT

## 2023-11-27 PROCEDURE — 83550 IRON BINDING TEST: CPT

## 2023-11-27 RX ORDER — SERTRALINE HYDROCHLORIDE 50 MG/1
50 TABLET, FILM COATED ORAL DAILY
Qty: 90 TABLET | Refills: 2 | Status: SHIPPED | OUTPATIENT
Start: 2023-11-27 | End: 2023-11-28 | Stop reason: SDUPTHER

## 2023-11-27 NOTE — PROGRESS NOTES
"Subjective   Patient ID: Denise Richards is a 28 y.o. female who presents for Follow-up.    HPI   The patient reports that she is taking Zoloft for her depression and anxiety and has no major side effects from it however, she feels that it can be better controlled so she is interested in increasing the dose.    Review of Systems  Constitutional: No fever or chills  Cardiovascular: no chest pain, no palpitations and no syncope.   Respiratory: no cough, no shortness of breath during exertion and no shortness of breath at rest.   Gastrointestinal: no abdominal pain, no nausea and no vomiting.  Neuro: No Headache, no dizziness    Objective   /70   Pulse 85   Ht 1.549 m (5' 1\")   Wt 52.2 kg (115 lb)   SpO2 99%   BMI 21.73 kg/m²     Physical Exam  Constitutional: Alert and in no acute distress. Well developed, well nourished  Head and Face: Head and face: Normal.    Cardiovascular: Heart rate and rhythm were normal, normal S1 and S2. No peripheral edema.   Pulmonary: No respiratory distress. Clear bilateral breath sounds.  Musculoskeletal: Gait and station: Normal. Muscle strength/tone: Normal.   Skin: Normal skin color and pigmentation, normal skin turgor, and no rash.    Psychiatric: Judgment and insight: Intact. Mood and affect: Normal.    Lab Results   Component Value Date    WBC 5.5 06/11/2022    HGB 10.3 (L) 06/11/2022    HCT 31.3 (L) 06/11/2022     06/11/2022     06/08/2022    K 3.6 06/08/2022     06/08/2022    CREATININE 0.73 06/08/2022    BUN 9 06/08/2022    CO2 21 06/08/2022    INR 1.2 (H) 06/08/2022       XR forearm  Narrative: Interpreted By:  ARTEMIO SOLIS MD  MRN: [id number]  Patient Name: DENISE RICHARDS     STUDY:  FOREARM, MIN 2 VIEWS;  9/14/2023 2:54 pm     INDICATION:  s/p fracture  S62.174A: Closed nondisplaced fracture of trapezium of  right wrist, initial encounter.     COMPARISON:  06/08/2022     ACCESSION NUMBER(S):  [id number]     ORDERING CLINICIAN:  OSVALDO CHOUDHARY"   FINDINGS:  Left forearm, two views     Plate and screw fixation of a radial diaphyseal fracture with intact  hardware. Postsurgical changes in the ulnar status post removal of  hardware. Remote fracture deformity which is healed present. Surgical  clips over the soft tissues. No acute fracture dislocation seen     Impression: No acute fracture. Remote ulnar and radial diaphyseal fracture  deformities status post fixation with removal of the hardware in the  ulna. The hardware in the radius is intact.      Assessment/Plan   Diagnoses and all orders for this visit:  Generalized anxiety disorder with panic attacks  -     Follow Up In [facility] - PCP - Established  -     sertraline (Zoloft) 50 mg tablet; Take 1 tablet (50 mg) by mouth once daily.  Other depression  -     Follow Up In [facility] - PCP - Established  -     sertraline (Zoloft) 50 mg tablet; Take 1 tablet (50 mg) by mouth once daily.        Dear Cely Chong     It was my pleasure to take care of you today in the office. Below are the things we discussed today:    1. Depression and anxiety: Increase Zoloft to 50 mg. The patient will send me a Lingu[employer]o message if she has any issues with this dose.     Your yearly Physical is due in: Oct 2024  When you call the office for your yearly Physical, please ask them to inform me to order your blood work, so that you can get the fasting blood work before your appointment and we can discuss the results at your physical.      Please call me if any questions arise from now until your next visit. I will call you after I am done seeing patients. A Doctor is always available by phone when the office is closed. Please feel free to call for help with any problem that you feel shouldn't wait until the office re-opens.     Scribe Attestation  By signing my name below, Krissy PATEL Scribe   attest that this documentation has been prepared under the direction and in the presence of Edna BLAND  MD Nilesh.

## 2023-11-28 RX ORDER — SERTRALINE HYDROCHLORIDE 50 MG/1
50 TABLET, FILM COATED ORAL DAILY
Qty: 90 TABLET | Refills: 2 | Status: SHIPPED | OUTPATIENT
Start: 2023-11-28

## 2023-12-20 ENCOUNTER — DOCUMENTATION (OUTPATIENT)
Dept: OCCUPATIONAL THERAPY | Facility: HOSPITAL | Age: 28
End: 2023-12-20
Payer: COMMERCIAL

## 2023-12-20 NOTE — PROGRESS NOTES
Occupational Therapy Discharge      Patient Name: Cely Chong  MRN: [id number]  Today's Date: 12/20/2023      Referred for: L forearm ORIF s/p hardware removal  Referred by: Dr Nichols    Discharge Information:   Date of Discharge: 12/20/23  Date of Last Visit: 11/6/23  Date of Evaluation: 9/20/23  Number of Visits Attended: 8    Discharge Status: improved ROM/strength, performing HEP     Rehab Discharge Reason: Achieved all and/or the most significant goals(s)

## 2024-05-06 NOTE — OP NOTE
PREOPERATIVE DIAGNOSIS:  Left forearm painful orthopedic hardware.    POSTOPERATIVE DIAGNOSIS:  Left forearm painful orthopedic hardware.    OPERATION/PROCEDURE:  Removal of hardware, left ulna.    SURGEON:  Reece Nichols MD.    ASSISTANT(S):  Assistant surgeon, Justin Washburn, PGY-2.    ANESTHESIA:  General.    INDICATIONS:  The patient is a 28-year-old female who had an ORIF of both-bone  forearm fracture with me.  Please see my operative report for further  details of the procedure.  It has been over a year, and she has been  having painful orthopedic hardware, so plan was to remove her plate  today of the ulna.  Risks and benefits of surgical versus nonsurgical  management were explained to the patient.  Surgical intervention was  agreed upon.  Informed consent was obtained.     OPERATION IN DETAIL:  The patient was brought to the operating room.  Time-out was  performed.  The patient was placed under general anesthesia.  Patient  given preoperative antibiotics and TXA.  The patient was prepped and  draped in usual sterile fashion.  A pre-incision pause occurred.  We  started by reopening her ulnar incision.  Sharp dissection was taken  down through the skin, subcutaneous tissue.  HemaClear tourniquet was  placed on prior to this.  The fascia between the FCU and ECU was  identified and split right down onto the plate.  I then elevated the  muscle and scar off the plate very carefully with a knife and  periosteal elevator.  Once I was able to elevate off all the muscle,  I then removed 3 screws proximally and removed 3 screws distally.  I  then used the periosteal elevator and elevated the plate off.  Once  the plate was off, I then curetted out the holes and rongeured out  the rind and the excess bone formation around the holes.  I then  copiously irrigated.  AP and lateral x-rays were taken of the ulna  that shows that the plate was fully removed.  This completed our  removal of hardware.  After we  copiously irrigated, I placed  vancomycin and tobramycin powder in the wound.  0 PDS was used to  close the fascia, 2-0 Monocryl was used to close subcutaneous tissue,  and 2-0 nylon was used to close the skin.  I used 0.5% Marcaine  locally.  Xeroform, fluffs, Webril, and a soft dressing were then  placed.     POSTOPERATIVE PLAN:  The patient will be weightbearing as tolerated, left upper extremity.   She will be on aspirin for DVT prophylaxis.  She will follow up with  me in 3 weeks with 2 views of the left forearm.     I was present for all critical portions of this case.      Reece Nichols MD    DD:  05/22/2023 08:25:37 EST  DT:  05/22/2023 08:55:37 EST  DICTATION NUMBER:  [id number]  INTERNAL JOB NUMBER:  [id number]    CC:  Reece Nichols MD, Fax: [phone]        Electronic Signatures:  Reece Nichols) (Signed on 22-May-2023 12:22)   Authored  Unsigned, Draft (SYS GENERATED) (Entered on 22-May-2023 08:55)   Entered    Last Updated: 22-May-2023 12:22 by Reece Nichols)

## 2024-08-24 DIAGNOSIS — F41.1 GENERALIZED ANXIETY DISORDER WITH PANIC ATTACKS: ICD-10-CM

## 2024-08-24 DIAGNOSIS — F41.0 GENERALIZED ANXIETY DISORDER WITH PANIC ATTACKS: ICD-10-CM

## 2024-08-24 DIAGNOSIS — F32.89 OTHER DEPRESSION: ICD-10-CM

## 2024-08-25 RX ORDER — SERTRALINE HYDROCHLORIDE 50 MG/1
50 TABLET, FILM COATED ORAL DAILY
Qty: 90 TABLET | Refills: 0 | Status: SHIPPED | OUTPATIENT
Start: 2024-08-25

## 2024-10-28 ENCOUNTER — PATIENT MESSAGE (OUTPATIENT)
Dept: PRIMARY CARE | Facility: CLINIC | Age: 29
End: 2024-10-28
Payer: COMMERCIAL

## 2024-10-28 DIAGNOSIS — E55.9 VITAMIN D DEFICIENCY: ICD-10-CM

## 2024-10-28 DIAGNOSIS — Z00.00 ROUTINE GENERAL MEDICAL EXAMINATION AT A HEALTH CARE FACILITY: Primary | ICD-10-CM

## 2024-11-25 DIAGNOSIS — F41.1 GENERALIZED ANXIETY DISORDER WITH PANIC ATTACKS: ICD-10-CM

## 2024-11-25 DIAGNOSIS — F32.89 OTHER DEPRESSION: ICD-10-CM

## 2024-11-25 DIAGNOSIS — F41.0 GENERALIZED ANXIETY DISORDER WITH PANIC ATTACKS: ICD-10-CM

## 2024-11-26 RX ORDER — SERTRALINE HYDROCHLORIDE 50 MG/1
50 TABLET, FILM COATED ORAL DAILY
Qty: 90 TABLET | Refills: 0 | Status: SHIPPED | OUTPATIENT
Start: 2024-11-26

## 2025-01-29 ASSESSMENT — PROMIS GLOBAL HEALTH SCALE
RATE_QUALITY_OF_LIFE: VERY GOOD
RATE_AVERAGE_FATIGUE: MODERATE
RATE_SOCIAL_SATISFACTION: VERY GOOD
RATE_MENTAL_HEALTH: GOOD
RATE_AVERAGE_PAIN: 3
EMOTIONAL_PROBLEMS: SOMETIMES
RATE_GENERAL_HEALTH: GOOD
CARRYOUT_PHYSICAL_ACTIVITIES: MOSTLY
RATE_PHYSICAL_HEALTH: GOOD
CARRYOUT_SOCIAL_ACTIVITIES: GOOD

## 2025-01-30 LAB
25(OH)D3+25(OH)D2 SERPL-MCNC: 28 NG/ML (ref 30–100)
ALBUMIN SERPL-MCNC: 4.5 G/DL (ref 3.6–5.1)
ALP SERPL-CCNC: 61 U/L (ref 31–125)
ALT SERPL-CCNC: 10 U/L (ref 6–29)
ANION GAP SERPL CALCULATED.4IONS-SCNC: 10 MMOL/L (CALC) (ref 7–17)
AST SERPL-CCNC: 15 U/L (ref 10–30)
BILIRUB SERPL-MCNC: 0.7 MG/DL (ref 0.2–1.2)
BUN SERPL-MCNC: 10 MG/DL (ref 7–25)
CALCIUM SERPL-MCNC: 9.4 MG/DL (ref 8.6–10.2)
CHLORIDE SERPL-SCNC: 104 MMOL/L (ref 98–110)
CHOLEST SERPL-MCNC: 188 MG/DL
CHOLEST/HDLC SERPL: 2.6 (CALC)
CO2 SERPL-SCNC: 26 MMOL/L (ref 20–32)
CREAT SERPL-MCNC: 0.66 MG/DL (ref 0.5–0.96)
EGFRCR SERPLBLD CKD-EPI 2021: 123 ML/MIN/1.73M2
ERYTHROCYTE [DISTWIDTH] IN BLOOD BY AUTOMATED COUNT: 12 % (ref 11–15)
EST. AVERAGE GLUCOSE BLD GHB EST-MCNC: 97 MG/DL
EST. AVERAGE GLUCOSE BLD GHB EST-SCNC: 5.4 MMOL/L
GLUCOSE SERPL-MCNC: 86 MG/DL (ref 65–99)
HBA1C MFR BLD: 5 % OF TOTAL HGB
HCT VFR BLD AUTO: 44.1 % (ref 35–45)
HDLC SERPL-MCNC: 71 MG/DL
HGB BLD-MCNC: 14.4 G/DL (ref 11.7–15.5)
LDLC SERPL CALC-MCNC: 101 MG/DL (CALC)
MCH RBC QN AUTO: 29.9 PG (ref 27–33)
MCHC RBC AUTO-ENTMCNC: 32.7 G/DL (ref 32–36)
MCV RBC AUTO: 91.5 FL (ref 80–100)
NONHDLC SERPL-MCNC: 117 MG/DL (CALC)
PLATELET # BLD AUTO: 207 THOUSAND/UL (ref 140–400)
PMV BLD REES-ECKER: 11 FL (ref 7.5–12.5)
POTASSIUM SERPL-SCNC: 3.8 MMOL/L (ref 3.5–5.3)
PROT SERPL-MCNC: 7.5 G/DL (ref 6.1–8.1)
RBC # BLD AUTO: 4.82 MILLION/UL (ref 3.8–5.1)
SODIUM SERPL-SCNC: 140 MMOL/L (ref 135–146)
TRIGL SERPL-MCNC: 73 MG/DL
TSH SERPL-ACNC: 1.6 MIU/L
VIT B12 SERPL-MCNC: 342 PG/ML (ref 200–1100)
WBC # BLD AUTO: 6.6 THOUSAND/UL (ref 3.8–10.8)

## 2025-02-04 NOTE — PROGRESS NOTES
"Subjective   Patient ID: Cely Chong is a 29 y.o. female who presents for Annual Exam.    Last Annual Physical: Oct 2020  Last Dental Visit:  Due  Last Eye exam: Up-to-date   Hearing Concerns: No   Diet: Well- balanced   Exercise Routine: Back into exercise       HPI     The patient reports that she is taking Zoloft for her depression and anxiety and has no major side effects from it.  She takes vitamin D and Tylenol as needed.     She is here today to discuss her blood work results.  Blood pressure is well-controlled at this time.  Vitamin D slightly low; however, better than before. Other labs are in the normal range.      She thinks her depression and anxiety is well controlled on the current dose of Zoloft.      Review of Systems  Constitutional: No fever or chills, No Night Sweats  Eyes: No Blurry Vision or Eye sight problems  ENT: No Nasal Discharge, Hoarseness, sore throat + congestion  Cardiovascular: no chest pain, no palpitations and no syncope.   Respiratory: no cough, no shortness of breath during exertion and no shortness of breath at rest.   Gastrointestinal: no abdominal pain, no nausea and no vomiting.   : No vaginal discharge, burning with urination, no blood in urine or stools  Skin: No Skin rashes or Lesions  Neuro: No Headache, no dizziness or Numbness or tingling  Psych: No Anxiety, depression or sleeping problems  Heme: No Easy bleeding or brusing.     Objective   /80   Pulse 83   Ht 1.549 m (5' 1\")   Wt 56.7 kg (125 lb)   SpO2 98%   BMI 23.62 kg/m²     Physical Exam  Constitutional: Alert and in no acute distress. Well developed, well nourished.   Head and Face: Head and face: Normal.    Eyes: Normal external exam. Pupils were equal in size, round, reactive to light (PERRL) with normal accommodation and extraocular movements intact (EOMI).   Ears, Nose, Mouth, and Throat: External inspection of ears and nose: Normal.  Hearing: Normal.  Nasal mucosa, septum, and turbinates: " Normal.  Lips, teeth, and gums: Normal.  Oropharynx: Normal.   Neck: No neck mass was observed. Supple. Thyroid not enlarged and there were no palpable thyroid nodules.   Cardiovascular: Heart rate and rhythm were normal, normal S1 and S2. Pedal pulses: Normal. No peripheral edema.   Pulmonary: No respiratory distress. Clear bilateral breath sounds.   Breast: Normal Appearance, No Masses or lumps palpated  Abdomen: Soft nontender; no abdominal mass palpated. Normal bowel sounds. No organomegaly.   : Vagina and cervix normal.  Musculoskeletal: No joint swelling seen, normal movements of all extremities. Range of motion: Normal.  Muscle strength/tone: Normal.    Skin: Normal skin color and pigmentation, normal skin turgor, and no rash.   Neurologic: Deep tendon reflexes were 2+ and symmetric.   Psychiatric: Judgment and insight: Intact. Mood and affect: Normal.  Lymphatic: No cervical lymphadenopathy. Palpation of lymph nodes in axillae: Normal.  Palpation of lymph nodes in groin: Normal.    Lab Results   Component Value Date    WBC 6.6 01/29/2025    HGB 14.4 01/29/2025    HCT 44.1 01/29/2025     01/29/2025    CHOL 188 01/29/2025    TRIG 73 01/29/2025    HDL 71 01/29/2025    ALT 10 01/29/2025    AST 15 01/29/2025     01/29/2025    K 3.8 01/29/2025     01/29/2025    CREATININE 0.66 01/29/2025    BUN 10 01/29/2025    CO2 26 01/29/2025    TSH 1.60 01/29/2025    INR 1.2 (H) 06/08/2022    HGBA1C 5.0 01/29/2025       XR forearm  Narrative: Interpreted By:  ARTEMIO SOLIS MD  MRN: [id number]  Patient Name: DENISE RICHARDS     STUDY:  FOREARM, MIN 2 VIEWS;  9/14/2023 2:54 pm     INDICATION:  s/p fracture  S62.174A: Closed nondisplaced fracture of trapezium of  right wrist, initial encounter.     COMPARISON:  06/08/2022     ACCESSION NUMBER(S):  [id number]     ORDERING CLINICIAN:  OSVALDO CHOUDHARY     FINDINGS:  Left forearm, two views     Plate and screw fixation of a radial diaphyseal fracture with  intact  hardware. Postsurgical changes in the ulnar status post removal of  hardware. Remote fracture deformity which is healed present. Surgical  clips over the soft tissues. No acute fracture dislocation seen     Impression: No acute fracture. Remote ulnar and radial diaphyseal fracture  deformities status post fixation with removal of the hardware in the  ulna. The hardware in the radius is intact.      Assessment/Plan   Diagnoses and all orders for this visit:  Annual physical exam  Generalized anxiety disorder with panic attacks  -     sertraline (Zoloft) 50 mg tablet; Take 1 tablet (50 mg) by mouth once daily.  Encounter for immunization  -     Tdap vaccine, age 7 years and older  (BOOSTRIX)  Screening for cervical cancer  -     THINPREP PAP TEST        Dear Cely Chong     It was my pleasure to take care of you today in the office. Below are the things we discussed today:    1. 1. Immunizations: Yearly Flu shot is recommended. Up-to-date         a: COVID: Please get booster from the pharmacy         b: Tetanus: Last in 2014, will get today         C. HPV: Up-to-date    2. Blood Work: Reviewed   3. Seen your dentist twice a year  4. Yearly Eye exam is recommended    5. BMI: Normal  6: Diet recommendations:   Eat Clean, Try to have as many home cooked meals as possible  Avoid processed foods which contain excess calories, sugar, and sodium.    7. Exercise recommendations:   150 minutes a week to maintain your weight     If you have to lose weight, you need a better diet and exercise plan.     8. PAP - Done today    9. Depression and anxiety:  Continue 25 mg of Zoloft once daily.  Continue following up with Psych.     Follow up in one year for a Physical. Please call the office before your Physical to see if you need blood work completed prior to your physical.     Please call me if any questions arise from now until your next visit. I will call you after I am done seeing patients. A Doctor is always  available by phone when the office is closed. Please feel free to call for help with any problem that you feel shouldn't wait until the office re-opens.       Scribe Attestation  By signing my name below, I, Issa Hwang   attest that this documentation has been prepared under the direction and in the presence of Edna Galloway MD.

## 2025-02-05 ENCOUNTER — APPOINTMENT (OUTPATIENT)
Dept: PRIMARY CARE | Facility: CLINIC | Age: 30
End: 2025-02-05
Payer: COMMERCIAL

## 2025-02-05 VITALS
BODY MASS INDEX: 23.6 KG/M2 | OXYGEN SATURATION: 98 % | SYSTOLIC BLOOD PRESSURE: 133 MMHG | HEIGHT: 61 IN | DIASTOLIC BLOOD PRESSURE: 80 MMHG | WEIGHT: 125 LBS | HEART RATE: 83 BPM

## 2025-02-05 DIAGNOSIS — Z12.4 SCREENING FOR CERVICAL CANCER: ICD-10-CM

## 2025-02-05 DIAGNOSIS — Z23 ENCOUNTER FOR IMMUNIZATION: ICD-10-CM

## 2025-02-05 DIAGNOSIS — Z00.00 ANNUAL PHYSICAL EXAM: Primary | ICD-10-CM

## 2025-02-05 DIAGNOSIS — F41.1 GENERALIZED ANXIETY DISORDER WITH PANIC ATTACKS: ICD-10-CM

## 2025-02-05 DIAGNOSIS — F41.0 GENERALIZED ANXIETY DISORDER WITH PANIC ATTACKS: ICD-10-CM

## 2025-02-05 PROCEDURE — 88175 CYTOPATH C/V AUTO FLUID REDO: CPT

## 2025-02-05 PROCEDURE — 1036F TOBACCO NON-USER: CPT | Performed by: FAMILY MEDICINE

## 2025-02-05 PROCEDURE — 90715 TDAP VACCINE 7 YRS/> IM: CPT | Performed by: FAMILY MEDICINE

## 2025-02-05 PROCEDURE — 99395 PREV VISIT EST AGE 18-39: CPT | Performed by: FAMILY MEDICINE

## 2025-02-05 PROCEDURE — 3008F BODY MASS INDEX DOCD: CPT | Performed by: FAMILY MEDICINE

## 2025-02-05 PROCEDURE — 90471 IMMUNIZATION ADMIN: CPT | Performed by: FAMILY MEDICINE

## 2025-02-05 RX ORDER — SERTRALINE HYDROCHLORIDE 50 MG/1
50 TABLET, FILM COATED ORAL DAILY
Qty: 90 TABLET | Refills: 3 | Status: SHIPPED | OUTPATIENT
Start: 2025-02-05

## 2025-02-05 RX ORDER — CHOLECALCIFEROL (VITAMIN D3) 50 MCG
50 TABLET ORAL DAILY
COMMUNITY

## 2025-02-05 ASSESSMENT — ANXIETY QUESTIONNAIRES
4. TROUBLE RELAXING: SEVERAL DAYS
5. BEING SO RESTLESS THAT IT IS HARD TO SIT STILL: NOT AT ALL
7. FEELING AFRAID AS IF SOMETHING AWFUL MIGHT HAPPEN: SEVERAL DAYS
2. NOT BEING ABLE TO STOP OR CONTROL WORRYING: NOT AT ALL
GAD7 TOTAL SCORE: 5
IF YOU CHECKED OFF ANY PROBLEMS ON THIS QUESTIONNAIRE, HOW DIFFICULT HAVE THESE PROBLEMS MADE IT FOR YOU TO DO YOUR WORK, TAKE CARE OF THINGS AT HOME, OR GET ALONG WITH OTHER PEOPLE: SOMEWHAT DIFFICULT
6. BECOMING EASILY ANNOYED OR IRRITABLE: SEVERAL DAYS
1. FEELING NERVOUS, ANXIOUS, OR ON EDGE: SEVERAL DAYS
3. WORRYING TOO MUCH ABOUT DIFFERENT THINGS: SEVERAL DAYS

## 2025-02-05 ASSESSMENT — PATIENT HEALTH QUESTIONNAIRE - PHQ9
SUM OF ALL RESPONSES TO PHQ9 QUESTIONS 1 AND 2: 0
2. FEELING DOWN, DEPRESSED OR HOPELESS: NOT AT ALL
1. LITTLE INTEREST OR PLEASURE IN DOING THINGS: NOT AT ALL

## 2025-02-19 LAB
CYTOLOGY CMNT CVX/VAG CYTO-IMP: NORMAL
LAB AP HPV GENOTYPE QUESTION: YES
LAB AP HPV HR: NORMAL
LABORATORY COMMENT REPORT: NORMAL
LMP START DATE: NORMAL
PATH REPORT.TOTAL CANCER: NORMAL

## 2025-05-06 ENCOUNTER — APPOINTMENT (OUTPATIENT)
Dept: PRIMARY CARE | Facility: CLINIC | Age: 30
End: 2025-05-06
Payer: COMMERCIAL

## 2025-05-06 VITALS
WEIGHT: 128 LBS | OXYGEN SATURATION: 97 % | HEIGHT: 61 IN | BODY MASS INDEX: 24.17 KG/M2 | HEART RATE: 75 BPM | DIASTOLIC BLOOD PRESSURE: 69 MMHG | SYSTOLIC BLOOD PRESSURE: 113 MMHG

## 2025-05-06 DIAGNOSIS — R22.0 SCALP MASS: ICD-10-CM

## 2025-05-06 DIAGNOSIS — R59.0 POSTERIOR AURICULAR LYMPHADENOPATHY: Primary | ICD-10-CM

## 2025-05-06 PROCEDURE — 1036F TOBACCO NON-USER: CPT | Performed by: FAMILY MEDICINE

## 2025-05-06 PROCEDURE — 3008F BODY MASS INDEX DOCD: CPT | Performed by: FAMILY MEDICINE

## 2025-05-06 PROCEDURE — 99213 OFFICE O/P EST LOW 20 MIN: CPT | Performed by: FAMILY MEDICINE

## 2025-05-06 NOTE — PROGRESS NOTES
"Subjective   Patient ID: Cely Chong is a 30 y.o. female who presents for Swollen Glands (Behind ears).    HPI     The patient reports that she is taking Zoloft for her depression and anxiety and has no major side effects from it.  She takes vitamin D and Tylenol as needed.      She thinks her depression and anxiety is well controlled on the current dose of Zoloft.     Today she presents with concerns over a couple cysts behind her right ear which are painful and noted a third cyst on scalp today.  She had a sinus infection in March which have gotten better.     Review of Systems  Constitutional: No fever or chills  ENT:  +Painful cysts behind the right ear and scalp    Cardiovascular: no chest pain, no palpitations and no syncope.   Respiratory: no cough, no shortness of breath during exertion and no shortness of breath at rest.   Gastrointestinal: no abdominal pain, no nausea and no vomiting.  Neuro: No Headache, no dizziness    Objective   /69   Pulse 75   Ht 1.549 m (5' 1\")   Wt 58.1 kg (128 lb)   SpO2 97%   BMI 24.19 kg/m²     Physical Exam  Constitutional: Alert and in no acute distress. Well developed, well nourished  Head and Face: Head and face: Normal.    ENT: Both ears are congested. She has a cyst on the posterior scalp and 2 cysts behind the right ear which are a  centimeter in size   Cardiovascular: Heart rate and rhythm were normal, normal S1 and S2. No peripheral edema.   Pulmonary: No respiratory distress. Clear bilateral breath sounds.  Musculoskeletal: Gait and station: Normal. Muscle strength/tone: Normal.   Skin: Normal skin color and pigmentation, normal skin turgor, and no rash.    Psychiatric: Judgment and insight: Intact. Mood and affect: Normal.      Lab Results   Component Value Date    WBC 6.6 01/29/2025    HGB 14.4 01/29/2025    HCT 44.1 01/29/2025     01/29/2025    CHOL 188 01/29/2025    TRIG 73 01/29/2025    HDL 71 01/29/2025    ALT 10 01/29/2025    AST 15 " 01/29/2025     01/29/2025    K 3.8 01/29/2025     01/29/2025    CREATININE 0.66 01/29/2025    BUN 10 01/29/2025    CO2 26 01/29/2025    TSH 1.60 01/29/2025    INR 1.2 (H) 06/08/2022    HGBA1C 5.0 01/29/2025       XR forearm  Narrative: Interpreted By:  ARTEMIO SOLIS MD  MRN: [id number]  Patient Name: DENISE RICHARDS     STUDY:  FOREARM, MIN 2 VIEWS;  9/14/2023 2:54 pm     INDICATION:  s/p fracture  S62.174A: Closed nondisplaced fracture of trapezium of  right wrist, initial encounter.     COMPARISON:  06/08/2022     ACCESSION NUMBER(S):  [id number]     ORDERING CLINICIAN:  OSVALDO CHOUDHARY     FINDINGS:  Left forearm, two views     Plate and screw fixation of a radial diaphyseal fracture with intact  hardware. Postsurgical changes in the ulnar status post removal of  hardware. Remote fracture deformity which is healed present. Surgical  clips over the soft tissues. No acute fracture dislocation seen     Impression: No acute fracture. Remote ulnar and radial diaphyseal fracture  deformities status post fixation with removal of the hardware in the  ulna. The hardware in the radius is intact.      Assessment/Plan   Assessment & Plan  Posterior auricular lymphadenopathy  US head and neck ordered.   Orders:    US head neck soft tissue; Future    Scalp mass  US head and neck ordered.   Orders:    US head neck soft tissue; Future      Your yearly Physical is due in: Feb 2026  When you call the office for your yearly Physical, please ask them to inform me to order your blood work, so that you can get the fasting blood work before your appointment and we can discuss the results at your physical.      Please call me if any questions arise from now until your next visit. I will call you after I am done seeing patients. A Doctor is always available by phone when the office is closed. Please feel free to call for help with any problem that you feel shouldn't wait until the office re-opens.     I, Edna Galloway MD,  attest that this note for 5/6/2025 accurately reflects documentation that my scribe Quin Lopez, made at my direction in my capacity as Edna Galloway MD when I treated Cely Chong.    Scribe Attestation  By signing my name below, I, Kobe Hwangibe   attest that this documentation has been prepared under the direction and in the presence of Edna Galloway MD.

## 2025-05-07 ENCOUNTER — HOSPITAL ENCOUNTER (OUTPATIENT)
Dept: RADIOLOGY | Facility: HOSPITAL | Age: 30
Discharge: HOME | End: 2025-05-07
Payer: COMMERCIAL

## 2025-05-07 DIAGNOSIS — R59.0 POSTERIOR AURICULAR LYMPHADENOPATHY: ICD-10-CM

## 2025-05-07 DIAGNOSIS — R22.0 SCALP MASS: ICD-10-CM

## 2025-05-07 PROCEDURE — 76536 US EXAM OF HEAD AND NECK: CPT

## 2025-05-07 PROCEDURE — 76536 US EXAM OF HEAD AND NECK: CPT | Performed by: RADIOLOGY

## 2025-05-28 ENCOUNTER — APPOINTMENT (OUTPATIENT)
Dept: DERMATOLOGY | Facility: CLINIC | Age: 30
End: 2025-05-28
Payer: COMMERCIAL

## 2025-05-28 DIAGNOSIS — R59.0 LYMPHADENOPATHY, POSTAURICULAR: ICD-10-CM

## 2025-05-28 DIAGNOSIS — L72.11 PILAR CYST: Primary | ICD-10-CM

## 2025-05-28 PROCEDURE — 99203 OFFICE O/P NEW LOW 30 MIN: CPT | Performed by: DERMATOLOGY

## 2025-05-28 ASSESSMENT — DERMATOLOGY PATIENT ASSESSMENT
ARE YOU ON BIRTH CONTROL: NO
ARE YOU TRYING TO GET PREGNANT: NO
HAVE YOU HAD OR DO YOU HAVE A STAPH INFECTION: NO
DO YOU USE A TANNING BED: NO
HAVE YOU HAD OR DO YOU HAVE VASCULAR DISEASE: NO
DO YOU HAVE IRREGULAR MENSTRUAL CYCLES: NO
ARE YOU AN ORGAN TRANSPLANT RECIPIENT: NO
DO YOU USE SUNSCREEN: OCCASIONALLY
DO YOU HAVE ANY NEW OR CHANGING LESIONS: YES

## 2025-05-28 ASSESSMENT — PATIENT GLOBAL ASSESSMENT (PGA): PATIENT GLOBAL ASSESSMENT: PATIENT GLOBAL ASSESSMENT:  1 - CLEAR

## 2025-05-28 ASSESSMENT — DERMATOLOGY QUALITY OF LIFE (QOL) ASSESSMENT
DATE THE QUALITY-OF-LIFE ASSESSMENT WAS COMPLETED: 67353
ARE THERE EXCLUSIONS OR EXCEPTIONS FOR THE QUALITY OF LIFE ASSESSMENT: NO
RATE HOW BOTHERED YOU ARE BY SYMPTOMS OF YOUR SKIN PROBLEM (EG, ITCHING, STINGING BURNING, HURTING OR SKIN IRRITATION): 1
RATE HOW EMOTIONALLY BOTHERED YOU ARE BY YOUR SKIN PROBLEM (FOR EXAMPLE, WORRY, EMBARRASSMENT, FRUSTRATION): 1
RATE HOW BOTHERED YOU ARE BY EFFECTS OF YOUR SKIN PROBLEMS ON YOUR ACTIVITIES (EG, GOING OUT, ACCOMPLISHING WHAT YOU WANT, WORK ACTIVITIES OR YOUR RELATIONSHIPS WITH OTHERS): 1

## 2025-05-28 ASSESSMENT — ITCH NUMERIC RATING SCALE: HOW SEVERE IS YOUR ITCHING?: 4

## 2025-05-28 NOTE — Clinical Note
Favor pilar cyst - left occipital scalp   -No central punctum. No surrounding erythema or tenderness to palpation.   -If becomes inflamed, encouraged patient to send mychart message and can prescribe antibiotics and/or in office visit for ILK.   -May consider referral to dermatologic surgery if cyst becomes recurrently inflamed.

## 2025-05-28 NOTE — Clinical Note
Palpable postauricular lymphadenopathy  -Patient reports lesions appeared 3 months ago after sinus infection.   -US head/neck soft tissue (5/6/25) demonstrated 4 small nonspecific lymph nodes in the right postauricular region (largest measuring 1 x 0.2 x 1cm)   -Given that lymphadenopathy has not resolved, will refer patient to ENT for further evaluation.

## 2025-05-28 NOTE — Clinical Note
Hi Dr. Galloway,   We saw Cely Chong in clinic yesterday for evaluation of the cyst on her left posterior scalp. We recommended observation as it was not inflamed and asymptomatic, but discussed oral antibiotics vs ILK if the lesion becomes inflamed in the future. We also discussed her right postauricular lymphadenopathy and referred her to ENT for further evaluation/management. Thank you!   Best,   Ophelia

## 2025-05-28 NOTE — PROGRESS NOTES
Subjective     Cely Chong is a 30 y.o. female who presents for the following: Suspicious Skin Lesion (Scalp and rt ear).     Patient here today for evaluation of a lesion on her left posterior scalp. She reports lesion has been present for approximately 3 months. She notes it occasionally swells and becomes painful especially after lying down or applying pressure to the lesion. She notes lesion is asymptomatic today. She would like to discuss treatment options.    Patient also notes several bumps behind her right ear which also appeared 3 months ago shortly after a sinus infection. She was evaluated by her PCP for this concern and had ultrasound. US head/neck soft tissue (5/6/25) demonstrated 4 small nonspecific lymph nodes in the right postauricular region (largest measuring 1 x 0.2 x 1cm) as well as a 9 x 2 x 7mm ovoid hypoechoic cyst. She notes the lesions are intermittently painful especially when touched and have not resolved.       Intake Questions  Do you have any new or changing Lesions?: Yes  Are you an organ transplant recipient?: No  Have you had or do you have a Staph Infection?: No  Have you had or do you have Vacular Disease?: No  Do you use sunscreen?: Occasionally  Do you use a tanning bed?: No  Are you trying to get pregnant?: No  Are you on birth control?: No  Do you have irregular menstrual cycles?: No    Review of Systems:  No other skin or systemic complaints other than what is documented elsewhere in the note.    The following portions of the chart were reviewed this encounter and updated as appropriate:   Tobacco  Allergies  Meds  Problems  Med Hx  Surg Hx         Skin Cancer History  Biopsy Log Book  No skin cancers from Specimen Tracking.    Additional History      Specialty Problems    None       Objective   Well appearing patient in no apparent distress; mood and affect are within normal limits.    A focused skin examination was performed. All findings within normal limits  unless otherwise noted below.    Assessment/Plan   Skin Exam  1. PILAR CYST  Scalp  On the left occipital scalp, there is a 1cm skin colored  mobile subcutaneous nodule. No surrounding erythema or tenderness to palpation.   Favor pilar cyst - left occipital scalp   -No central punctum. No surrounding erythema or tenderness to palpation.   -If becomes inflamed, encouraged patient to send mychart message and can prescribe antibiotics and/or in office visit for ILK.   -May consider referral to dermatologic surgery if cyst becomes recurrently inflamed.   2. LYMPHADENOPATHY, POSTAURICULAR  Right Postauricular Area  On the right postauricular scalp, there are 3 palpable skin colored subcutaneous nodules.   Palpable postauricular lymphadenopathy  -Patient reports lesions appeared 3 months ago after sinus infection.   -US head/neck soft tissue (5/6/25) demonstrated 4 small nonspecific lymph nodes in the right postauricular region (largest measuring 1 x 0.2 x 1cm)   -Given that lymphadenopathy has not resolved, will refer patient to ENT for further evaluation.   Related Procedures  Referral to ENT    RTC PRN.     Ophelia Roth DO     I saw and evaluated the patient. I personally obtained the key and critical portions of the history and physical exam or was physically present for key and critical portions performed by the resident/fellow. I reviewed the resident/fellow's documentation and discussed the patient with the resident/fellow. I agree with the resident/fellow's medical decision making as documented in the note and made changes where appropriate.    Lois Mchugh MD

## 2025-05-28 NOTE — Clinical Note
On the left occipital scalp, there is a 1cm skin colored  mobile subcutaneous nodule. No surrounding erythema or tenderness to palpation.

## 2025-05-29 ASSESSMENT — ENCOUNTER SYMPTOMS
NECK PAIN: 1
HEADACHES: 1

## 2025-06-03 ENCOUNTER — APPOINTMENT (OUTPATIENT)
Facility: CLINIC | Age: 30
End: 2025-06-03
Payer: COMMERCIAL

## 2025-06-03 VITALS
BODY MASS INDEX: 24.19 KG/M2 | TEMPERATURE: 98 F | WEIGHT: 128 LBS | OXYGEN SATURATION: 99 % | SYSTOLIC BLOOD PRESSURE: 98 MMHG | DIASTOLIC BLOOD PRESSURE: 64 MMHG | HEART RATE: 76 BPM

## 2025-06-03 DIAGNOSIS — R59.0 LYMPHADENOPATHY, POSTAURICULAR: Primary | ICD-10-CM

## 2025-06-03 PROCEDURE — 99203 OFFICE O/P NEW LOW 30 MIN: CPT

## 2025-06-03 PROCEDURE — 1036F TOBACCO NON-USER: CPT

## 2025-06-03 ASSESSMENT — PAIN SCALES - GENERAL: PAINLEVEL_OUTOF10: 0-NO PAIN

## 2025-06-03 NOTE — PROGRESS NOTES
Patient ID: Cely Chong is a 30 y.o. female who presents for evaluation of postauricular right ear lump. Patient presents as a referral from dermatologist Dr. Lois Mchugh.    PROVIDER IMPRESSIONS:  DIAGNOSES/PROBLEMS:  - Benign reactive postauricular lymphadenopathy, right     ASSESSMENT:   Cely Chong is a pleasant 30 y.o. female who presents for initial encounter for tender masses/edema localized to the right postauricular region. Exam today revealed 3 visible subcutaneous nodules along the right postauricular area that appear small (<1 cm), raised, skin-colored and soft/smooth and slightly tender on palpation. Recent head/neck ultrasound results from 5/6/25 were reviewed in detail with the patient, which revealed 4 small nonspecific/superficial lymph nodes in the right postauricular region (largest measuring 1 x 0.2 x 1cm) that appear with more benign sonographic morphology including hyperechoic edwina.  The ultrasound findings of small (<1 cm), benign-appearing lymph nodes with preserved hyperechoic edwina and no suspicious features suggest benign lymphadenopathy of reactive or nonspecific etiology. We discussed some of the possible reactive etiologies, such as URI/illness or vaccinations preceding onset. Given presenting symptom duration has remained persistent for over 3 months, we discussed that further CT head/neck imaging workup is reasonable to further evaluate for findings consistent with possible lymphoma and/or metastatic head and neck malignancy.     PLAN:  I recommended CT soft tissue head/neck w contrast for further evaluation of right postauricular mass. Order e-submitted and patient instructed to contact  Radiology to coordinate follow-up.   Follow-up: Patient may schedule for follow-up virtually after obtaining CT imaging to review the results, sooner as needed. May consider referral to my ENT physician partners for further E/M with biopsy vs. routine observation at follow-up, pending  results. Patient is agreeable to plan. All questions answered to patient's satisfaction.     Subjective   HPI: Cely Chong is a 30 y.o. female who is referred by dermatology and presents for initial evaluation for right postauricular bumps. Reports they initially appeared approximately 3 months ago and have remained present since then. She notes the lesions are intermittently painful especially when touched and have not resolved. Denies any recent growth, drainage, warmth or skin discoloration of the of nodules behind the right ear. Denies the presence of fever/chills, night sweats, unintentional weight loss. She does note slight tenderness at times behind her left ear which she attributes to contact irritation from wearing her glasses. Patient seen by PCP on 5/6/25 for this concern and underwent head/neck ultrasound. Reports recent URI/illness 6 months ago, states she came down with a sinus infection and treated via telehealth with amoxicillin course. Patient seen by PCP on 2/5/2025 for her annual exam and states that during visit she received Tdap and annual influenza vaccines. Denies any past medical history of cancer or autoimmune conditions. Reports prior surgical history of multiple sinonasal surgeries and remote tonsillectomy/adenoidectomy in childhood.    PATIENT HISTORY:  Medical History[1]   Surgical History[2]   RX Allergies[3]   Current Medications[4]   Tobacco Use: Low Risk  (5/28/2025)    Patient History     Smoking Tobacco Use: Never     Smokeless Tobacco Use: Never     Passive Exposure: Not on file      Alcohol Use: Not on file      Social History     Substance and Sexual Activity   Drug Use Never        Review of Systems   All other systems negative.     Objective   PHYSICAL EXAM:   Right Ear: External inspection of ear with no deformity, scars, or masses [see postauricular exam findings below]. EAC is clear.  TM is intact with no sign of infection, effusion, or retraction.  No perforation  seen.  Left Ear: External inspection of ear with no deformity, scars, or masses. EAC is clear.  TM is intact with no sign of infection, effusion, or retraction.  No perforation seen.  TMJ: Normal, no trismus.  Oral Cavity/Mouth: Oral cavity and oropharynx mucosa moist and pink. No lesions or masses. Dentition normal. Tonsils are surgically absent. Uvula is midline. Tongue with no masses or lesions. Tongue with good mobility. The oropharynx is clear.  Nose: External inspection of nose: No nasal lesions, lacerations or scars. No tenderness on frontal or maxillary sinus palpation. Anterior rhinoscopy with limited visualization past the inferior turbinates: Septum is essentially midline.  No septal perforation or lesions. No septal hematoma/ seroma.  No signs of bleeding.  No evidence of intranasal polyps.  Inferior turbinates appear minimal/reduced.    Head: Atraumatic with no masses, lesions or scarring.  Face: Normal symmetry. No scars or deformities.  Constitutional: No fever, chills, weight loss or weight gain  General appearance: Appears well, well-nourished, well groomed. No acute distress.   Communication: Normal communication  Psychiatric: Oriented to person, place and time. Normal mood and affect.  Neurologic: Cranial nerves II-XII grossly intact and symmetric bilaterally.  Eyes: Conjunctiva not edematous or erythematous. PERRLA  Neck: Normal appearing, symmetric, trachea midline.   Cardiovascular: Examination of peripheral vascular system shows no clubbing or cyanosis.  Respiratory: No respiratory distress increased work of breathing. Inspection of the chest with symmetric chest expansion and normal respiratory effort.  Skin: No head and neck rashes.  Lymph nodes: There are 3 visible nodules in right postauricular area that appear small (<1 cm), raised, skin-colored and soft/smooth and slightly tender on palpation, otherwise no sign of adenopathy.    RESULTS:  -I personally reviewed the patient's head/neck  ultrasound from 5/6/25, with the following summary of findings reported: In the right post auricular region 4 small nonspecific lymph nodes are seen in the superficial soft tissues which maintain more benign sonographic morphology including hyperechoic edwina. Largest individual node measures 1 x 0.2 x 1 cm.    Linda Pacheco, APRN-CNP          [1]   Past Medical History:  Diagnosis Date    Anxiety     Depression     Personal history of other drug therapy     History of varicella vaccination    Personal history of other mental and behavioral disorders     History of anxiety disorder   [2]   Past Surgical History:  Procedure Laterality Date    COSMETIC SURGERY      FRACTURE SURGERY      OTHER SURGICAL HISTORY  08/10/2020    Nasal septoplasty    OTHER SURGICAL HISTORY  08/10/2020    Tonsillectomy    OTHER SURGICAL HISTORY  08/10/2020    Oral surgery    OTHER SURGICAL HISTORY  11/01/2022    Forearm surgery    SINUS SURGERY     [3]   Allergies  Allergen Reactions    Cat Dander Unknown    Dog Dander Unknown    Gluten Other    Kiwi Unknown    Latex Rash    Sulfa (Sulfonamide Antibiotics) Rash   [4]   Current Outpatient Medications:     acetaminophen (Tylenol Extra Strength) 500 mg tablet, Take by mouth., Disp: , Rfl:     cholecalciferol (Vitamin D3) 50 MCG (2000 UT) tablet, Take 1 tablet (50 mcg) by mouth once daily., Disp: , Rfl:     sertraline (Zoloft) 50 mg tablet, Take 1 tablet (50 mg) by mouth once daily., Disp: 90 tablet, Rfl: 3

## 2025-06-17 ENCOUNTER — HOSPITAL ENCOUNTER (OUTPATIENT)
Dept: RADIOLOGY | Facility: HOSPITAL | Age: 30
Discharge: HOME | End: 2025-06-17
Payer: COMMERCIAL

## 2025-06-17 DIAGNOSIS — R59.0 LYMPHADENOPATHY, POSTAURICULAR: ICD-10-CM

## 2025-06-17 PROCEDURE — 2550000001 HC RX 255 CONTRASTS

## 2025-06-17 PROCEDURE — 70491 CT SOFT TISSUE NECK W/DYE: CPT

## 2025-06-17 PROCEDURE — 70491 CT SOFT TISSUE NECK W/DYE: CPT | Performed by: RADIOLOGY

## 2025-06-17 RX ADMIN — IOHEXOL 75 ML: 350 INJECTION, SOLUTION INTRAVENOUS at 08:56

## 2025-06-18 NOTE — PROGRESS NOTES
Patient ID: Cely Chong is a 30 y.o. female who presents for virtual follow-up visit.     An interactive audio and video telecommunication system which permits real time communications between the patient (at the originating site) and provider (at the distant site) was utilized to provide this telehealth service.   Verbal consent was requested and obtained from Cely Chong on this date, 06/20/25 for a telehealth visit and the patient's location was confirmed at the time of the visit.     PROVIDER IMPRESSIONS:  DIAGNOSES/PROBLEMS:  - Postauricular masses on the right side, multiple    ASSESSMENT:   Cely Chong is a pleasant 30 y.o. female who virtually presents for subsequent evaluation of multiple right postauricular masses characterized as mildly tender and stable in size for greater than 3 months duration. Recent CT head/neck wo contrast results reviewed in detail with the patient, which revealed 3 right postauricular nodules vs. Lymph nodes measuring as 8 mm or less in size. Based on the clinical information provided, symptoms and clinical exam findings are consistent with right postauricular nodules/masses vs. Reactive postauricular lymphadenopathy. Patient offered reassurance and counseling on the current clinical findings and management recommendations as detailed below.      PLAN:  I recommended referral to my  head/neck physician partners in the next 6 months for further E/M of right postauricular nodules with possible local biopsy, if indicated. Referral e-submitted and patient informed she will be contacted to schedule head/neck physician follow-up.   Follow-up: Patient may schedule for follow-up with me as needed. Patient is agreeable to this plan, all questions were answered to patient's satisfaction.     At today's virtual visit with Cely Chong , the number of minutes I spent providing patient care was 10. More than 50% of that time was spent counseling the patient on possible  etiologies, test results, treatment options and care coordination.    Subjective   HPI: Cely Chong is a 30 y.o. female who presents for a virtual follow-up evaluation for multiple right postauricular region masses that are tender and have remained present for more than 3 months. Patient also presents to review recent CT soft tissue neck imaging results. Since last visit on, the patient states that symptoms have stayed the same. Denies noticing any recent enlargement or fluctuations in size of the postauricular masses. Denies any recent right-sided postauricular skin drainage, discoloration/erythema, warmth, or edema. In review, patient reports history of recent URI/illness approximately 6 months ago and in February 2025 had obtained Tdap/influenza vaccinations.     RECALL 6/3/25:  HPI: Cely Chong is a 30 y.o. female who is referred by dermatology and presents for initial evaluation for right postauricular bumps. Reports they initially appeared approximately 3 months ago and have remained present since then. She notes the lesions are intermittently painful especially when touched and have not resolved. Denies any recent growth, drainage, warmth or skin discoloration of the of nodules behind the right ear. Denies the presence of fever/chills, night sweats, unintentional weight loss. She does note slight tenderness at times behind her left ear which she attributes to contact irritation from wearing her glasses. Patient seen by PCP on 5/6/25 for this concern and underwent head/neck ultrasound. Reports recent URI/illness 6 months ago, states she came down with a sinus infection and treated via telehealth with amoxicillin course. Patient seen by PCP on 2/5/2025 for her annual exam and states that during visit she received Tdap and annual influenza vaccines. Denies any past medical history of cancer or autoimmune conditions. Reports prior surgical history of multiple sinonasal surgeries and remote  tonsillectomy/adenoidectomy in childhood.   ASSESSMENT:   Cely Chong is a pleasant 30 y.o. female who presents for initial encounter for tender masses/edema localized to the right postauricular region. Exam today revealed 3 visible subcutaneous nodules along the right postauricular area that appear small (<1 cm), raised, skin-colored and soft/smooth and slightly tender on palpation. Recent head/neck ultrasound results from 5/6/25 were reviewed in detail with the patient, which revealed 4 small nonspecific/superficial lymph nodes in the right postauricular region (largest measuring 1 x 0.2 x 1cm) that appear with more benign sonographic morphology including hyperechoic edwina.  The ultrasound findings of small (<1 cm), benign-appearing lymph nodes with preserved hyperechoic edwina and no suspicious features suggest benign lymphadenopathy of reactive or nonspecific etiology. We discussed some of the possible reactive etiologies, such as URI/illness or vaccinations preceding onset. Given presenting symptom duration has remained persistent for over 3 months, we discussed that further CT head/neck imaging workup is reasonable to further evaluate for findings consistent with possible lymphoma and/or metastatic head and neck malignancy.   PLAN:  I recommended CT soft tissue head/neck w contrast for further evaluation of right postauricular mass. Order e-submitted and patient instructed to contact  Radiology to coordinate follow-up.   Follow-up: Patient may schedule for follow-up virtually after obtaining CT imaging to review the results, sooner as needed. May consider referral to my ENT physician partners for further E/M with biopsy vs. routine observation at follow-up, pending results. Patient is agreeable to plan. All questions answered to patient's satisfaction.     PATIENT HISTORY:  Medical History[1]   Surgical History[2]   RX Allergies[3]   Current Medications[4]   Tobacco Use: Low Risk  (6/3/2025)    Patient  History     Smoking Tobacco Use: Never     Smokeless Tobacco Use: Never     Passive Exposure: Not on file      Alcohol Use: Not on file      Social History     Substance and Sexual Activity   Drug Use Never        Review of Systems   All other systems negative.     Objective     TELEHEALTH PHYSICAL EXAM:  PSYCH: Alert and oriented with appropriate mood and affect  VOICE: No hoarseness or other audible abnormality  RESPIRATION: Breathing comfortably, no stridor; normal breathing effort  CONSTITUTIONAL: No acute distress, normal facial features; No fever; no chills  CV: No cyanosis visible on the face and neck area  EYES: Pupils equal and round; no erythema; conjunctiva clear; sclera white  NEURO: Alert and oriented, able to raise eyebrows symmetrical bilateral, smile with no facial droop, able to swallow  HEAD AND FACE: Symmetric facial features, no masses or lesions Right ear examination: External ear normal. Left ear examination: External ear normal.  NOSE: External nose midline  ORAL CAVITY: No lesions of external lips; uvula is midline; tongue with good mobility; no gross mass in oral cavity; mucosa appears pink  NECK/LYMPH: No obvious deformity or lesions; trachea is midline     RESULTS:  -I personally reviewed the patient's CT soft tissue head/neck wo contrast from 6/17/25, with the following summary of findings reported: The palpable finding in the right retroauricular region corresponds to a collection of 3 nodules versus lymph nodes 8 mm or less in size.  These also abut the superficial margin of the right parotid gland; parotid lesion is not excluded.    Linda Pacheco, APRN-CNP          [1]   Past Medical History:  Diagnosis Date    Anxiety     Depression     Personal history of other drug therapy     History of varicella vaccination    Personal history of other mental and behavioral disorders     History of anxiety disorder   [2]   Past Surgical History:  Procedure Laterality Date    COSMETIC  SURGERY      FRACTURE SURGERY      OTHER SURGICAL HISTORY  08/10/2020    Nasal septoplasty    OTHER SURGICAL HISTORY  08/10/2020    Tonsillectomy    OTHER SURGICAL HISTORY  08/10/2020    Oral surgery    OTHER SURGICAL HISTORY  11/01/2022    Forearm surgery    SINUS SURGERY     [3]   Allergies  Allergen Reactions    Cat Dander Unknown    Dog Dander Unknown    Gluten Other    Kiwi Unknown    Latex Rash    Sulfa (Sulfonamide Antibiotics) Rash   [4]   Current Outpatient Medications:     acetaminophen (Tylenol Extra Strength) 500 mg tablet, Take by mouth., Disp: , Rfl:     cholecalciferol (Vitamin D3) 50 MCG (2000 UT) tablet, Take 1 tablet (50 mcg) by mouth once daily., Disp: , Rfl:     sertraline (Zoloft) 50 mg tablet, Take 1 tablet (50 mg) by mouth once daily., Disp: 90 tablet, Rfl: 3

## 2025-06-20 ENCOUNTER — APPOINTMENT (OUTPATIENT)
Facility: CLINIC | Age: 30
End: 2025-06-20
Payer: COMMERCIAL

## 2025-06-20 DIAGNOSIS — R22.0 MASS OF POSTAURICULAR AREA: Primary | ICD-10-CM

## 2025-06-20 PROCEDURE — 1036F TOBACCO NON-USER: CPT

## 2025-06-20 PROCEDURE — 99212 OFFICE O/P EST SF 10 MIN: CPT

## 2025-06-26 ENCOUNTER — TELEPHONE (OUTPATIENT)
Dept: PRIMARY CARE | Facility: CLINIC | Age: 30
End: 2025-06-26
Payer: COMMERCIAL

## 2025-06-26 DIAGNOSIS — Z00.00 ROUTINE GENERAL MEDICAL EXAMINATION AT A HEALTH CARE FACILITY: Primary | ICD-10-CM

## 2025-07-17 ENCOUNTER — ALLIED HEALTH (OUTPATIENT)
Dept: GENETICS | Facility: CLINIC | Age: 30
End: 2025-07-17
Payer: COMMERCIAL

## 2025-07-17 VITALS
OXYGEN SATURATION: 100 % | TEMPERATURE: 97.9 F | BODY MASS INDEX: 24.7 KG/M2 | HEIGHT: 61 IN | WEIGHT: 130.8 LBS | SYSTOLIC BLOOD PRESSURE: 115 MMHG | RESPIRATION RATE: 18 BRPM | HEART RATE: 83 BPM | DIASTOLIC BLOOD PRESSURE: 79 MMHG

## 2025-07-17 DIAGNOSIS — Z31.5 ENCOUNTER FOR PROCREATIVE GENETIC COUNSELING: ICD-10-CM

## 2025-07-17 DIAGNOSIS — Z13.79 ASHKENAZI JEWISH ANCESTRY REQUIRING POPULATION-SPECIFIC GENETIC SCREENING: ICD-10-CM

## 2025-07-17 DIAGNOSIS — Z31.430 ENCOUNTER OF FEMALE FOR TESTING FOR GENETIC DISEASE CARRIER STATUS FOR PROCREATIVE MANAGEMENT: ICD-10-CM

## 2025-07-17 PROCEDURE — 96041 GENETIC COUNSELING SVC EA 30: CPT

## 2025-07-17 ASSESSMENT — PAIN SCALES - GENERAL: PAINLEVEL_OUTOF10: 0-NO PAIN

## 2025-07-17 NOTE — PROGRESS NOTES
Thank you for the referral of Ms. Cely Chong. she is a 30 y.o.  female who was seen for genetic counseling due to desire for carrier screening.    PAST HISTORY:   Patient has no prior pregnancies. She and her partner, Wisam, are thinking about starting their family in the near future. They are both overall healthy, and both have Ashkenazi [employer] ancestry, and are therefore interested in preconception carrier screening.     FAMILY HISTORY  Medical and family histories were reviewed and the following concerns were apparent:  Patient:  -Sister (living, 26) had a spinal fusion at 12 years old due to scoliosis.   -Mother (living, 50s) had a hysterectomy due to an unknown reason. She also has thyroid problems and nerve problems that have worsened with age. Mother reportedly had negative BRCA genetic testing due to family history.   -Maternal grandmother (living) has Parkinson's.   -Maternal great aunt (sister of maternal grandmother, living) was initially diagnosed with breast cancer in her 50s or 60s. She had a bilateral mastectomy.   -Maternal first cousin (living, 20s) has Kunz syndrome.     Patient's partner:  -Sister (living, 29) had preconception carrier screening that identified she is a carrier of the following: glycogen storage disease, ornithine aminotransferase deficiency, and steroid resistant nephrotic syndrome. She is overall healthy.   -Niece (living, 3mo) is overall meeting milestones but is in the 6th %ile in size.  -Father (living, 60s) with Chron's/colitis; he had surgery and now has a colostomy bag. He also has gout and unspecified nerve issues.   -Distant maternal cousin (, 15)  of complications of a disease; patient is unsure if it was HPV or hepatitis but believes it was something that a vaccine is available for.       The remainder of the family history was negative for birth defects, intellectual disability, recurrent pregnancy loss, or recognized inherited conditions.  Consanguinity denied.    REPORTED ETHNICITY/RACE:  Patient: Ashkenazi Amish  Patient's partner: Ashkenazi Amish    COUNSELING:    The following information was discussed with your patient:    Per updated ACOG recommendations from 2017, we discussed the availability, benefits, and limitations of carrier screening for cystic fibrosis (CF), spinal muscular atrophy (SMA), and hemoglobinopathies/thalassemias. We reviewed the carrier frequencies of these conditions, varied clinical manifestations, and their autosomal recessive inheritance. If both members of the couple are found to be carriers for the same condition, there may be a 25% chance for an affected child and prenatal diagnosis would be available. Negative carrier screening does not rule out the possibility of being a carrier.  screening is available for CF, hemoglobinopathies, and thalassemias, and SMA.   In accordance with the most recent carrier screening guidelines from ACMG published in , we also discussed the availability of more expanded carrier screening for additional, primarily autosomal recessive, and some X-linked conditions. Current ACMG guidelines recommend all pregnant patients and those planning a pregnancy should be offered Tier 3 carrier screening. Tier 3 carrier screening includes conditions with a carrier frequency >=1/200 in any ethnic group with reasonable representation in the [place]. Larger panels that include conditions less common than those in Tier 3 are also available, and are considered Tier 4 screening.   Individuals of Ashkenazi Amish ancestry are at increased risk to be carriers of a number of disorders with the most common including, but not limited to: Freeman Sachs disease, Canavan disease, Cystic Fibrosis, Familial Dysautonomia, Lorna Pick Disease, Types A & B, Fanconi Anemia Group C, Gaucher disease, Bloom syndrome, Mucolipidosis type IV, Spinal Muscular Atrophy, Dihydrolipoamide Dehydrogenase  Deficiency, Familial Hyperinsulinism, Glycogen Storage disease Type 1A, Nemaline Myopathy, Usher Syndrome types 1F and 3, Suki syndrome, and Walker [place]. The autosomal recessive nature of these conditions as well as some basic clinical features of these conditions were reviewed. Approximately 1 in 4 individuals of Ashkenazi Anabaptism ancestry are a carrier for at least one of these disorders. Carrier testing for these conditions may be performed via common [place] mutation panels as well as sequencing platforms, or as part of an expanded carrier screening platform. These carrier screening panels do have the potential to identify health concerns for the tested individual or affected individuals who may be presymptomatic for some disorders.   We discussed the pros and cons of expanded carrier screening including the higher likelihood of being identified as a carrier for at least one condition on a larger panel. Approximately 4% of couples are found to be at risk to have a child with a genetic disorder based on expanded carrier screening. In rare cases, expanded carrier screening results may have health implications for the tested individual.   If Cely is identified as a carrier of one or more autosomal recessive genetic conditions, we will discuss what that condition is and what the reproductive risks are. Reproductive partners can then decide if they would like to proceed with carrier screening as well. Having results from both partners is often required to understand full scope of reproductive risk.   If patient and her partner are carriers for the same genetic conditions, there would be a 25% chance of having an affected child. They would have several options in that case including:   Conceive naturally and consider genetic testing in the prenatal period (as early as 10 weeks gestation) or  genetic testing.   In vitro fertilization (IVF) with embryo screening for the specific condition  (preimplantation genetic testing for monogenic disease//PGT-M).   Gamete donation/adoption  Additional family history concerns:  Patient inquired about testing for mutations in the BRCA genes. We reviewed that while about 1 in 400 individuals in the general population carry a mutation in one of these genes, 1 in 40 individuals of Ashkenazi Spiritism ancestry carry mutations in these genes. If Cely's mother is truly negative for a mutation in these genes, the chance that Cely herself has a mutation is low. If there is a known familial variant though, Cely would be eligible for testing. We briefly reviewed that a mutation in a gene that causes increased risk of cancer would  for her, but it could also increase the risk of a recessive disorder called Fanconi Anemia if she and her partner BOTH had mutations in the same BRCA gene.   Multifactorial inheritance. We reviewed that severe GI diagnoses, nerve disorders, and thyroid disease are often considered multifactorial, meaning that they are due to some combination of genetic and environmental risk factors. While we do not have specific testing to assess exact risk, we know that relatives of affected individuals have an elevated risk when compared to the general population of being similarly affected. Cely should inform her doctors, as well as any future pediatricians, of this family history to allow for early intervention and to maximize outcomes.   Having a cousin with Kunz syndrome does not increase the risk of having a child with Kunz syndrome for Cely. We briefly reviewed the availability of prenatal screening for chromosome disorders, including Kunz syndrome, Down syndrome, and several other chromosome disorders.     DISPOSITION:  The patient stated that she understood the above information and elected to proceed with carrier screening for 602 conditions via a [employer] panel. An order will be placed for a saliva kit to be mailed  to the patient's house with prepaid [email or web address] shipping materials.. Results will take about 3 weeks to return, at which time results and follow up plan will be discussed with patient and her partner.    Thank you for allowing us to participate in the care of your patient. Should you or your patient have any questions, please do not hesitate to contact our office at [phone].    Total time spent on day of encounter: 55 minutes (35 minutes with patient, 20 minutes on pre/post patient care activities, including documentation).    Sincerely,   Le Collier MS, [facility] [place]  Licensed and Certified Genetic Counselor

## 2025-07-22 NOTE — PROGRESS NOTES
History of Present Illness    Cely Chong is a 30 y.o. female who is seen at the request of Linda Pacheco for some post auricular nodule on the right side.  The patient first noticed these nodules back in February 2025.  At the time they were much more swollen and tender.  They are now smaller.  There is no more tenderness.  She had a CT scan of her neck that was done in June 2025 that I personally reviewed that shows these small nodules.  I could not appreciate anything else on that scan.  Of note is that these started about 2 weeks after she had an ipsilateral shoulder vaccine.      Past Medical History    The past medical history and review the system is basically negative.    Physical Exam    The patient is alert and oriented. Examination of the external ears, ear canals, and eardrums, is within normal limits. Examination of the anterior and external nose is negative. Examination of the oral cavity and oropharynx is normal. There is no evidence of any mucosal lesions. There is good mobility of the tongue and palate. There is good mandibular excursion. Palpation of the parotid, neck, and thyroid field fails to show any worrisome masses or adenopathies.  In the right postauricular area there is 3 separate nodularities that are very small but the underlying bone makes them fairly prominent.  They measure probably half a centimeter each.  They are not tender.  The scalp examination is negative.    A flexible laryngoscopy was carried out. Under topical Xylocaine and Lakhwinder-Synephrine the scope was introduced through the nostril. The nasopharynx, base of tongue, hypopharynx, and larynx are visualized.  The vocal cords are normally mobile. There is no pooling of secretions in the piriform sinuses. There is no evidence of any mucosal lesions.    Assessment and Plan    Small postauricular nodularities that are most likely inflamed lymph nodes.  It seems to have started after she got a vaccine in the right upper  arm area.  I very empirically put her on 10 days of Augmentin.  I also told her to not palpate the area on a regular basis.  At the same time she is to get back to me if she feels that this is getting larger or if it starts to hurt again.    I will see her in 2 months.

## 2025-07-23 ENCOUNTER — APPOINTMENT (OUTPATIENT)
Dept: OTOLARYNGOLOGY | Facility: CLINIC | Age: 30
End: 2025-07-23
Payer: COMMERCIAL

## 2025-07-23 VITALS — WEIGHT: 128.9 LBS | BODY MASS INDEX: 24.36 KG/M2

## 2025-07-23 DIAGNOSIS — R22.0 MASS OF POSTAURICULAR AREA: ICD-10-CM

## 2025-07-23 DIAGNOSIS — R59.0 LYMPHADENOPATHY, POSTAURICULAR: ICD-10-CM

## 2025-07-23 PROCEDURE — 1036F TOBACCO NON-USER: CPT | Performed by: OTOLARYNGOLOGY

## 2025-07-23 PROCEDURE — 99203 OFFICE O/P NEW LOW 30 MIN: CPT | Performed by: OTOLARYNGOLOGY

## 2025-07-23 PROCEDURE — 31575 DIAGNOSTIC LARYNGOSCOPY: CPT | Performed by: OTOLARYNGOLOGY

## 2025-07-23 RX ORDER — AMOXICILLIN AND CLAVULANATE POTASSIUM 875; 125 MG/1; MG/1
1 TABLET, FILM COATED ORAL 2 TIMES DAILY
Qty: 20 TABLET | Refills: 0 | Status: SHIPPED | OUTPATIENT
Start: 2025-07-23 | End: 2025-08-02

## 2025-07-23 ASSESSMENT — PATIENT HEALTH QUESTIONNAIRE - PHQ9
2. FEELING DOWN, DEPRESSED OR HOPELESS: NOT AT ALL
SUM OF ALL RESPONSES TO PHQ9 QUESTIONS 1 AND 2: 0
1. LITTLE INTEREST OR PLEASURE IN DOING THINGS: NOT AT ALL

## 2025-08-15 ENCOUNTER — TELEMEDICINE CLINICAL SUPPORT (OUTPATIENT)
Dept: GENETICS | Facility: CLINIC | Age: 30
End: 2025-08-15
Payer: COMMERCIAL

## 2025-08-15 DIAGNOSIS — Z31.5 ENCOUNTER FOR PROCREATIVE GENETIC COUNSELING: ICD-10-CM

## 2025-08-15 DIAGNOSIS — Z14.8 GENETIC CARRIER: ICD-10-CM

## 2025-08-15 PROCEDURE — 96041 GENETIC COUNSELING SVC EA 30: CPT

## 2025-10-01 ENCOUNTER — APPOINTMENT (OUTPATIENT)
Dept: OTOLARYNGOLOGY | Facility: CLINIC | Age: 30
End: 2025-10-01
Payer: COMMERCIAL

## 2025-10-31 ENCOUNTER — APPOINTMENT (OUTPATIENT)
Dept: OBSTETRICS AND GYNECOLOGY | Facility: CLINIC | Age: 30
End: 2025-10-31
Payer: COMMERCIAL

## 2025-11-03 ENCOUNTER — APPOINTMENT (OUTPATIENT)
Dept: OBSTETRICS AND GYNECOLOGY | Facility: CLINIC | Age: 30
End: 2025-11-03
Payer: COMMERCIAL